# Patient Record
Sex: FEMALE | Race: OTHER | NOT HISPANIC OR LATINO
[De-identification: names, ages, dates, MRNs, and addresses within clinical notes are randomized per-mention and may not be internally consistent; named-entity substitution may affect disease eponyms.]

---

## 2020-04-26 ENCOUNTER — MESSAGE (OUTPATIENT)
Age: 35
End: 2020-04-26

## 2020-05-07 LAB
SARS-COV-2 IGG SERPL IA-ACNC: <0.1 INDEX
SARS-COV-2 IGG SERPL QL IA: NEGATIVE

## 2020-12-29 ENCOUNTER — TRANSCRIPTION ENCOUNTER (OUTPATIENT)
Age: 35
End: 2020-12-29

## 2020-12-29 ENCOUNTER — EMERGENCY (EMERGENCY)
Facility: HOSPITAL | Age: 35
LOS: 1 days | Discharge: HOME | End: 2020-12-29
Attending: EMERGENCY MEDICINE
Payer: COMMERCIAL

## 2020-12-29 VITALS
OXYGEN SATURATION: 99 % | SYSTOLIC BLOOD PRESSURE: 110 MMHG | WEIGHT: 134.92 LBS | DIASTOLIC BLOOD PRESSURE: 77 MMHG | HEART RATE: 75 BPM | RESPIRATION RATE: 16 BRPM | HEIGHT: 63 IN | TEMPERATURE: 98 F

## 2020-12-29 LAB — SARS-COV-2 RNA SPEC QL NAA+PROBE: SIGNIFICANT CHANGE UP

## 2020-12-29 PROCEDURE — 99283 EMERGENCY DEPT VISIT LOW MDM: CPT

## 2020-12-29 PROCEDURE — 87635 SARS-COV-2 COVID-19 AMP PRB: CPT

## 2020-12-29 PROCEDURE — 99053 MED SERV 10PM-8AM 24 HR FAC: CPT

## 2020-12-29 NOTE — ED ADULT TRIAGE NOTE - CHIEF COMPLAINT QUOTE
We're exposed to coworker two weeks ago, she's positive " We're exposed to coworker two weeks ago, she's positive "  Denies symptoms of corona virus infection

## 2020-12-29 NOTE — ED PROVIDER NOTE - GASTROINTESTINAL, MLM
"Abdomen soft, non-tender, no guarding.

## 2020-12-29 NOTE — ED ADULT NURSE NOTE - CHIEF COMPLAINT QUOTE
We're exposed to coworker two weeks ago, she's positive "  Denies symptoms of corona virus infection

## 2020-12-29 NOTE — ED ADULT NURSE NOTE - NSIMPLEMENTINTERV_GEN_ALL_ED
Implemented All Universal Safety Interventions:  [place] to call system. Call bell, personal items and telephone within reach. Instruct patient to call for assistance. Room bathroom lighting operational. Non-slip footwear when patient is off stretcher. Physically safe environment: no spills, clutter or unnecessary equipment. Stretcher in lowest position, wheels locked, appropriate side rails in place.

## 2020-12-29 NOTE — ED PROVIDER NOTE - OBJECTIVE STATEMENT
Patient presenting to the ED for covid screening. Patient reports she was exposed to a covid positive coworker about 5 days ago. Denies symptoms including fever, chills, sweats, cough, chest pain, SOB. No other issues.

## 2020-12-29 NOTE — ED ADULT NURSE NOTE - OBJECTIVE STATEMENT
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 Patient is asymptomatic.

## 2020-12-29 NOTE — ED PROVIDER NOTE - PATIENT PORTAL LINK FT
You can access the FollowMyHealth Patient Portal offered by [facility] by registering at the following website: [email or web address]. By joining [employer]’s FollowMyHealth portal, you will also be able to view your health information using other applications (apps) compatible with our system.

## 2021-01-27 ENCOUNTER — EMERGENCY (EMERGENCY)
Facility: HOSPITAL | Age: 36
LOS: 1 days | Discharge: HOME | End: 2021-01-27
Attending: EMERGENCY MEDICINE
Payer: COMMERCIAL

## 2021-01-27 VITALS
DIASTOLIC BLOOD PRESSURE: 70 MMHG | SYSTOLIC BLOOD PRESSURE: 110 MMHG | RESPIRATION RATE: 16 BRPM | TEMPERATURE: 98 F | HEART RATE: 86 BPM | OXYGEN SATURATION: 100 %

## 2021-01-27 VITALS
DIASTOLIC BLOOD PRESSURE: 71 MMHG | TEMPERATURE: 97 F | OXYGEN SATURATION: 99 % | HEIGHT: 63 IN | SYSTOLIC BLOOD PRESSURE: 108 MMHG | RESPIRATION RATE: 16 BRPM | HEART RATE: 75 BPM

## 2021-01-27 LAB — SARS-COV-2 RNA SPEC QL NAA+PROBE: SIGNIFICANT CHANGE UP

## 2021-01-27 PROCEDURE — U0005: CPT

## 2021-01-27 PROCEDURE — 87635 SARS-COV-2 COVID-19 AMP PRB: CPT

## 2021-01-27 PROCEDURE — 99283 EMERGENCY DEPT VISIT LOW MDM: CPT

## 2021-01-27 PROCEDURE — 99053 MED SERV 10PM-8AM 24 HR FAC: CPT

## 2021-01-27 NOTE — ED ADULT TRIAGE NOTE - CHIEF COMPLAINT QUOTE
I was exposed by someone tested positive for COVID.  I do not have any symptoms.  I need a COVID PCR test.

## 2021-01-27 NOTE — ED PROVIDER NOTE - CLINICAL SUMMARY MEDICAL DECISION MAKING FREE TEXT BOX
36yo F [facility] employee presents after exposure to COVID 19 positive patient while at work. Will send covid swab. Patient stable for discharge.

## 2021-01-27 NOTE — ED PROVIDER NOTE - OBJECTIVE STATEMENT
36yo F [facility] [facility] employee presents after exposure to COVID 19 positive patient while at work.

## 2021-01-27 NOTE — ED PROVIDER NOTE - PATIENT PORTAL LINK FT
You can access the FollowMyHealth Patient Portal offered by [facility] by registering at the following website: [email or web address]. By joining [employer]’s FollowMyHealth portal, you will also be able to view your health information using other applications (apps) compatible with our system.

## 2021-08-01 ENCOUNTER — EMERGENCY (EMERGENCY)
Facility: HOSPITAL | Age: 36
LOS: 1 days | Discharge: HOME | End: 2021-08-01
Attending: EMERGENCY MEDICINE
Payer: COMMERCIAL

## 2021-08-01 VITALS
HEART RATE: 62 BPM | DIASTOLIC BLOOD PRESSURE: 64 MMHG | TEMPERATURE: 98 F | OXYGEN SATURATION: 97 % | WEIGHT: 134.92 LBS | HEIGHT: 63 IN | RESPIRATION RATE: 18 BRPM | SYSTOLIC BLOOD PRESSURE: 116 MMHG

## 2021-08-01 LAB — SARS-COV-2 RNA SPEC QL NAA+PROBE: SIGNIFICANT CHANGE UP

## 2021-08-01 PROCEDURE — 99283 EMERGENCY DEPT VISIT LOW MDM: CPT

## 2021-08-01 PROCEDURE — 87635 SARS-COV-2 COVID-19 AMP PRB: CPT

## 2021-08-01 PROCEDURE — 99282 EMERGENCY DEPT VISIT SF MDM: CPT

## 2021-08-01 NOTE — ED PROVIDER NOTE - PHYSICAL EXAMINATION
General: pt appears stated age and is not in distress  HEENT: AT/NC  Neck: supple, full ROM, trachea midline  Lungs: symmetric excursion  Extremities:  no deformities or fractures  Neuro: awake, alert, responsive; oriented to person, place and time; no neuro deficits normal steady gait

## 2021-08-01 NOTE — ED PROVIDER NOTE - PATIENT PORTAL LINK FT
You can access the FollowMyHealth Patient Portal offered by [facility] by registering at the following website: [email or web address]. By joining [employer]’s FollowMyHealth portal, you will also be able to view your health information using other applications (apps) compatible with our system.

## 2021-08-01 NOTE — ED ADULT TRIAGE NOTE - NS ED NURSE DIRECT TO ROOM YN
Detail Level: Detailed Instructed the patient to apply mupirocin 2% ointment twice daily prior to covering with a bandage. Reassured the patient that the wound has healed well and sutures will be removed today. The patient will follow up for Mohs on 8/23/2018. Reassured the patient that both shave removal sites are healing well. Instructed the patient to apply mupirocin 2% ointment to both sites daily instead of Vaseline. Yes

## 2022-01-01 NOTE — ED ADULT NURSE NOTE - PRO INTERPRETER NEED 2
English Right ear hearing screen completed date: 2022  Right ear screen method: EOAE (evoked otoacoustic emission)  Right ear screen result: Passed  Right ear screen comment: N/A    Left ear hearing screen completed date: 2022  Left ear screen method: EOAE (evoked otoacoustic emission)  Left ear screen result: Passed  Left ear screen comments: N/A

## 2022-01-25 ENCOUNTER — EMERGENCY (EMERGENCY)
Facility: HOSPITAL | Age: 37
LOS: 1 days | Discharge: HOME | End: 2022-01-25
Attending: EMERGENCY MEDICINE
Payer: COMMERCIAL

## 2022-01-25 VITALS
SYSTOLIC BLOOD PRESSURE: 102 MMHG | RESPIRATION RATE: 18 BRPM | WEIGHT: 149.91 LBS | HEART RATE: 96 BPM | DIASTOLIC BLOOD PRESSURE: 74 MMHG | HEIGHT: 63 IN | OXYGEN SATURATION: 97 %

## 2022-01-25 LAB
ALBUMIN SERPL ELPH-MCNC: 3.1 G/DL — LOW (ref 3.5–5)
ALP SERPL-CCNC: 89 U/L — SIGNIFICANT CHANGE UP (ref 40–120)
ALT FLD-CCNC: 42 U/L DA — SIGNIFICANT CHANGE UP (ref 10–60)
ANION GAP SERPL CALC-SCNC: 6 MMOL/L — SIGNIFICANT CHANGE UP (ref 5–17)
AST SERPL-CCNC: 25 U/L — SIGNIFICANT CHANGE UP (ref 10–40)
BILIRUB DIRECT SERPL-MCNC: <0.1 MG/DL — SIGNIFICANT CHANGE UP (ref 0–0.3)
BILIRUB SERPL-MCNC: 0.2 MG/DL — SIGNIFICANT CHANGE UP (ref 0.2–1.2)
BUN SERPL-MCNC: 6 MG/DL — LOW (ref 7–18)
CALCIUM SERPL-MCNC: 8.7 MG/DL — SIGNIFICANT CHANGE UP (ref 8.4–10.5)
CHLORIDE SERPL-SCNC: 106 MMOL/L — SIGNIFICANT CHANGE UP (ref 96–108)
CO2 SERPL-SCNC: 24 MMOL/L — SIGNIFICANT CHANGE UP (ref 22–31)
CREAT SERPL-MCNC: 0.56 MG/DL — SIGNIFICANT CHANGE UP (ref 0.5–1.3)
GLUCOSE SERPL-MCNC: 85 MG/DL — SIGNIFICANT CHANGE UP (ref 70–99)
HCG UR QL: POSITIVE
HCT VFR BLD CALC: 37.9 % — SIGNIFICANT CHANGE UP (ref 34.5–45)
HGB BLD-MCNC: 13.2 G/DL — SIGNIFICANT CHANGE UP (ref 11.5–15.5)
MCHC RBC-ENTMCNC: 34.2 PG — HIGH (ref 27–34)
MCHC RBC-ENTMCNC: 34.8 GM/DL — SIGNIFICANT CHANGE UP (ref 32–36)
MCV RBC AUTO: 98.2 FL — SIGNIFICANT CHANGE UP (ref 80–100)
NRBC # BLD: 0 /100 WBCS — SIGNIFICANT CHANGE UP (ref 0–0)
PLATELET # BLD AUTO: 323 K/UL — SIGNIFICANT CHANGE UP (ref 150–400)
POTASSIUM SERPL-MCNC: 4.1 MMOL/L — SIGNIFICANT CHANGE UP (ref 3.5–5.3)
POTASSIUM SERPL-SCNC: 4.1 MMOL/L — SIGNIFICANT CHANGE UP (ref 3.5–5.3)
PROT SERPL-MCNC: 7.5 G/DL — SIGNIFICANT CHANGE UP (ref 6–8.3)
RBC # BLD: 3.86 M/UL — SIGNIFICANT CHANGE UP (ref 3.8–5.2)
RBC # FLD: 13 % — SIGNIFICANT CHANGE UP (ref 10.3–14.5)
SODIUM SERPL-SCNC: 136 MMOL/L — SIGNIFICANT CHANGE UP (ref 135–145)
WBC # BLD: 10.81 K/UL — HIGH (ref 3.8–10.5)
WBC # FLD AUTO: 10.81 K/UL — HIGH (ref 3.8–10.5)

## 2022-01-25 PROCEDURE — 80053 COMPREHEN METABOLIC PANEL: CPT

## 2022-01-25 PROCEDURE — 99283 EMERGENCY DEPT VISIT LOW MDM: CPT

## 2022-01-25 PROCEDURE — 99284 EMERGENCY DEPT VISIT MOD MDM: CPT

## 2022-01-25 PROCEDURE — 81025 URINE PREGNANCY TEST: CPT

## 2022-01-25 PROCEDURE — 36415 COLL VENOUS BLD VENIPUNCTURE: CPT

## 2022-01-25 PROCEDURE — 85027 COMPLETE CBC AUTOMATED: CPT

## 2022-01-25 PROCEDURE — 82248 BILIRUBIN DIRECT: CPT

## 2022-01-25 NOTE — ED PROVIDER NOTE - NSFOLLOWUPINSTRUCTIONS_ED_ALL_ED_FT
Ramandeep BrownSpanichristina                                                                                                         Needlestick and Sharps Injury      A needlestick injury happens when a person is stuck with a needle or sharp tool (sharps) that may have someone else's blood on it. Needlestick injuries are most common among health care workers, but can also happen to people in the community who are exposed to needles. A needlestick injury may expose you to blood that carries infections such as:  •Hepatitis B.      •Hepatitis C.      •Human immunodeficiency virus (HIV).        What are the causes?    This injury is caused by a needle or other sharp tool that punctures your skin. It can happen to people who are:  •Giving an injection.      •Drawing blood.      •Performing medical procedures with a needle or scalpel.      •Handling or throwing away used needles (sharps).      •Cleaning equipment or patient care areas.      This injury can also occur if you:  •Accidentally come into contact with a needle that was discarded in a public place.      •Share a needle or inject illegal drugs.        What increases the risk?    This injury is more likely to happen to health care workers who:  •Recap needles.      •Pass sharp objects to another person.      •Do not use or have access to needle safety devices.      •Feel pressure or a sense of urgency in the work place when using needles.        What are the signs or symptoms?    Symptoms of this injury include:  •Pain or irritation at the injury site.      •Bleeding at the injury site.        How is this diagnosed?    This condition is diagnosed based on:  •A physical exam.      •How the injury happened.      Your health care provider may check the medical history of the person whose blood you were exposed to. That person's blood may also be tested.      How is this treated?     If you have a needle stick injury or think you may have been exposed to blood or body fluids:  •Wash the injured area right away with soap and water.      •Place a bandage or clean towel on the wound and apply gentle pressure to stop the bleeding. Do not squeeze or rub the area.    •Notify a work place supervisor or health care provider right away. Follow any procedures in your work place if applicable.  •If needed, treatment must be started as soon as possible after the exposure.        •Tell your health care provider if you are pregnant or breastfeeding.      Treatments may include:  •A tetanus booster shot. This shot may be given if you have not had a tetanus booster shot within the past 10 years.      •A hepatitis B vaccination.      •Blood tests. These may be recommended for up to 6 months after the injury to rule out infection.      •Medicines to prevent infection (post-exposure prophylaxis).      •Wound care.        Follow these instructions at home:    Medicines     •Take over-the-counter and prescription medicines only as told by your health care provider.      •If you were prescribed an antibiotic medicine, take it as told by your health care provider. Do not stop using the antibiotic even if you start to feel better.      General instructions     •Keep all follow-up visits as told by your health care provider. This is important.      Wound care   •There are many ways to close and cover a wound. For example, a wound can be covered with sutures, skin glue, or adhesive strips. Follow instructions from your health care provider about:  •How to take care of your wound.      •When and how you should change your dressing.        •Keep the dressing dry as told by your health care provider. Do not take baths, swim, use a hot tub, or do anything that would put your wound underwater until your health care provider approves.    •Check your wound every day for signs of infection. Check for:  •Redness, swelling, or pain.      •Fluid or blood.      •Pus or a bad smell.      •Warmth.          Contact a health care provider if you:    •Have redness, swelling, or pain at the injury site.      •Have a fever.      •Feel anxious, angry, or depressed.      •Have difficulty sleeping.      •Have skin or whites of your eyes that look yellow (jaundice).      •Have belly pain or a feeling of fullness.      •Have fatigue.      •Feel generally sick (malaise).      •Have frequent infections.        Summary    •A needlestick injury happens when a person is stuck with a needle or sharp object that may have someone else's blood on it. The injury may expose the person to blood that carries infections.      •Treatment starts with cleaning the injured area right away with soap and water.      •Treatment may also include a tetanus booster shot, a hepatitis B vaccine, and medicines to prevent infection.      This information is not intended to replace advice given to you by your health care provider. Make sure you discuss any questions you have with your health care provider.      Document Revised: 04/10/2020 Document Reviewed: 01/30/2019    [employer] Patient Education © 2021 [employer]

## 2022-01-25 NOTE — ED PROVIDER NOTE - PATIENT PORTAL LINK FT
You can access the FollowMyHealth Patient Portal offered by [facility] by registering at the following website: [email or web address]. By joining [employer]’s FollowMyHealth portal, you will also be able to view your health information using other applications (apps) compatible with our system.

## 2022-01-25 NOTE — ED PROVIDER NOTE - OBJECTIVE STATEMENT
36 year old female with no significant PMHx who is currently at 27 weeks gestation presents to the ED with complaints of a needle stick just prior to arrival. Patient states that she works as a respiratory therapist here at [employer], and was attending to a patient when she accidentally stuck herself with an ABG needle to her left hand. Patient notes that her hepatitis and tetanus vaccinations are all up to date. Patient is currently refusing HIV medications. NKDA.

## 2022-04-12 ENCOUNTER — OUTPATIENT (OUTPATIENT)
Dept: INPATIENT UNIT | Facility: HOSPITAL | Age: 37
LOS: 1 days | Discharge: HOME | End: 2022-04-12

## 2022-04-12 VITALS — SYSTOLIC BLOOD PRESSURE: 123 MMHG | DIASTOLIC BLOOD PRESSURE: 74 MMHG | HEART RATE: 75 BPM

## 2022-04-12 VITALS
DIASTOLIC BLOOD PRESSURE: 85 MMHG | HEART RATE: 93 BPM | TEMPERATURE: 99 F | SYSTOLIC BLOOD PRESSURE: 124 MMHG | RESPIRATION RATE: 16 BRPM

## 2022-04-12 DIAGNOSIS — Z98.890 OTHER SPECIFIED POSTPROCEDURAL STATES: Chronic | ICD-10-CM

## 2022-04-12 DIAGNOSIS — O26.899 OTHER SPECIFIED PREGNANCY RELATED CONDITIONS, UNSPECIFIED TRIMESTER: ICD-10-CM

## 2022-04-12 DIAGNOSIS — Z3A.00 WEEKS OF GESTATION OF PREGNANCY NOT SPECIFIED: ICD-10-CM

## 2022-04-12 PROCEDURE — 99203 OFFICE O/P NEW LOW 30 MIN: CPT

## 2022-04-12 NOTE — OB PROVIDER TRIAGE NOTE - ADDITIONAL INSTRUCTIONS
- Patient stable for discharge home with adequate outpatient follow up  - Instructed patient to follow up with OB/GYN within 1 week   - Educated and discussed labor precautions, fetal kick counts  - Educated and discussed concerning signs/symptoms to call OB/GYN and return to L&D including but not limited to vaginal bleeding, leakage of fluid, painful contractions, decreased fetal movement, fever/chills, shortness of breath, chest pain, rash, difficulty ambulating, nausea/vomiting/diarrhea, worsening of symptoms

## 2022-04-12 NOTE — OB RN TRIAGE NOTE - NSICDXPASTMEDICALHX_GEN_ALL_CORE_FT
PAST MEDICAL HISTORY:  No pertinent past medical history      PAST MEDICAL HISTORY:  Childhood asthma     No pertinent past medical history

## 2022-04-12 NOTE — OB PROVIDER TRIAGE NOTE - HISTORY OF PRESENT ILLNESS
36 year old  @ 38.0 weeks, MALLIKA 2022, consistent with 1st Trimester US, presents to L&D for decreased fetal movement intensity since this morning. Patient states she felt baby movement this AM, but less intense movement even after eating breakfast. Patient states she felt movements recently within 30 minutes, while waiting in triage. Patient states she feels intermittent tightening, but no abdominal pain. Patient states she adequately hydrates, > 5 glasses of water a day, and eats balanced diet. Patient states she has never had this happen before. Patient states that she is doing well otherwise, was at the OB/GYN office today and was told "everything is great" but sent in secondary to the decreased fetal movement frequency. Denies vaginal bleeding, leakage of fluid, painful contractions/lower abdominal cramping, fever/chills, shortness of breath,  chest pain, increased swelling, difficulty ambulating, loss of taste/smell, nausea/vomiting/diarrhea, rash, weakness, paresthesia, change in appetite, dizziness, lightheadedness, cough, nasal congestion, runny nose    OB History: : Current pregnancy, without complications as per patient    GYN Hx: Admits to 2 small fibroids noted on initial prenatal sonogram, Denies ovarian cysts, STDs, abnormal pap smears     Med Hx: Mild Intermittent Asthma, last used albuterol inhaler 1 year ago, no hospitalizations/intubations               Denies asthma, HTN, DM, CAD, or other medical issues    Meds: PNV, Albuterol PRN, denies other medications including prescribed/OTC     Allergies: NKDA, [employer], NKFA    Surgical Hx: Lasik , uncomplicated, denies other surgeries     Vitals: ICU Vital Signs Last 24 Hrs  T(C): 37.2 (2022 15:25), Max: 37.2 (2022 14:55)  T(F): 98.96 (2022 15:25), Max: 99 (2022 14:55)  HR: 68 (2022 16:56) (68 - 93)  BP: 114/76 (2022 16:56) (114/76 - 124/85)  BP(mean): --  ABP: --  ABP(mean): --  RR: 16 (2022 14:55) (16 - 16)  SpO2: --    Gen: NAD, A+O x 3, resting comfortably  Resp: CTAB  Cardio: RRR  Abd: Gravid, soft, non-distended, non-tender to superficial and deep palpation in all 4 quadrants, no rebound/guarding  Ext: Warm, well perfused, 1+ nonpitting edema bilaterally    EFM: 135 bpm, moderate variability with spontaneous accelerations, isolated subtle late deceleration, resolved spontaneously, otherwise Cat I tracing  [place]: Irregular painless contractions    SSE: deferred  SVE: closed/long/high, posterior     Bedside Transabdominal US: Cephalic presentation, posterior placenta, SYDNI 13.79 cm MVP 4.39 cm, BPP 8/8

## 2022-04-12 NOTE — OB PROVIDER TRIAGE NOTE - NSOBPROVIDERNOTE_OBGYN_ALL_OB_FT
36 year old  @ 38.0 weeks, presented for decreased fetal movement since this AM with decreased intensity of movement still feeling adequate fetal movement frequency, with BPP 8/8, noted isolated late deceleration with spontaneous recovery, vital signs stable  - Prolonged monitoring secondary to isolated to late deceleration, intrauterine resuscitative measure taken (maternal lateral repositioning, PO hydration)  - Continue continuous EFM/toco  - Discussed with Dr. Smith in triage regarding HPI, history, physical exam, EFM/toco, sonogram, assessment and plan    PA ADDENDUM   EFM:  [place]:   - Patient stable for discharge home with adequate outpatient follow up  - Instructed patient to follow up with OB/GYN within 1 week   - Educated and discussed labor precautions, fetal kick counts  - Educated and discussed concerning signs/symptoms to call OB/GYN and return to L&D including but not limited to vaginal bleeding, leakage of fluid, painful contractions, decreased fetal movement, fever/chills, shortness of breath, chest pain, rash, difficulty ambulating, nausea/vomiting/diarrhea, worsening of symptoms  - Patient states she understands and agrees with assessment and plan, all questions answered  - Discussed with Dr. Smith on L&D regarding EFM/toco, sonogram, assessment and plan 36 year old  @ 38.0 weeks, presented for decreased fetal movement since this AM with decreased intensity of movement still feeling adequate fetal movement frequency, with BPP 8/8, noted isolated late deceleration with spontaneous recovery, vital signs stable  - Prolonged monitoring secondary to isolated to late deceleration, intrauterine resuscitative measure taken (maternal lateral repositioning, PO hydration)  - Continue continuous EFM/toco  - Discussed with Dr. Smith in triage regarding HPI, history, physical exam, EFM/toco, sonogram, assessment and plan    PA ADDENDUM   EFM: 130 bpm, moderate variability with spontaneous accelerations, no decelerations, Cat I tracing  [place]: Irregular painless contractions  - Patient stable for discharge home with adequate outpatient follow up  - Instructed patient to follow up with OB/GYN within 1 week   - Educated and discussed labor precautions, fetal kick counts  - Educated and discussed concerning signs/symptoms to call OB/GYN and return to L&D including but not limited to vaginal bleeding, leakage of fluid, painful contractions, decreased fetal movement, fever/chills, shortness of breath, chest pain, rash, difficulty ambulating, nausea/vomiting/diarrhea, worsening of symptoms  - Patient states she understands and agrees with assessment and plan, all questions answered  - Discussed with Dr. Smith on L&D regarding EFM/toco, sonogram, assessment and plan

## 2022-04-12 NOTE — OB PROVIDER TRIAGE NOTE - NSHPLABSRESULTS_GEN_ALL_CORE
Prenatal Labs Reviewed:  T&S: Not in chart or history at this time  Rubella: Immune  Hep B: Neg  HIV: Neg   RPR: Neg  GTT: 73/171/132/88

## 2022-04-20 ENCOUNTER — OUTPATIENT (OUTPATIENT)
Dept: INPATIENT UNIT | Facility: HOSPITAL | Age: 37
LOS: 1 days | Discharge: HOME | End: 2022-04-20
Payer: COMMERCIAL

## 2022-04-20 VITALS
HEART RATE: 93 BPM | RESPIRATION RATE: 18 BRPM | DIASTOLIC BLOOD PRESSURE: 78 MMHG | SYSTOLIC BLOOD PRESSURE: 122 MMHG | TEMPERATURE: 98 F

## 2022-04-20 VITALS — SYSTOLIC BLOOD PRESSURE: 116 MMHG | DIASTOLIC BLOOD PRESSURE: 73 MMHG | HEART RATE: 72 BPM

## 2022-04-20 DIAGNOSIS — O26.899 OTHER SPECIFIED PREGNANCY RELATED CONDITIONS, UNSPECIFIED TRIMESTER: ICD-10-CM

## 2022-04-20 DIAGNOSIS — Z98.890 OTHER SPECIFIED POSTPROCEDURAL STATES: Chronic | ICD-10-CM

## 2022-04-20 DIAGNOSIS — Z3A.00 WEEKS OF GESTATION OF PREGNANCY NOT SPECIFIED: ICD-10-CM

## 2022-04-20 PROBLEM — J45.909 UNSPECIFIED ASTHMA, UNCOMPLICATED: Chronic | Status: ACTIVE | Noted: 2022-04-12

## 2022-04-20 PROCEDURE — 76818 FETAL BIOPHYS PROFILE W/NST: CPT | Mod: 26

## 2022-04-20 PROCEDURE — 99212 OFFICE O/P EST SF 10 MIN: CPT

## 2022-04-20 RX ORDER — ACETAMINOPHEN 500 MG
975 TABLET ORAL ONCE
Refills: 0 | Status: COMPLETED | OUTPATIENT
Start: 2022-04-20 | End: 2022-04-20

## 2022-04-20 RX ADMIN — Medication 975 MILLIGRAM(S): at 16:00

## 2022-04-20 RX ADMIN — Medication 975 MILLIGRAM(S): at 15:30

## 2022-04-20 NOTE — OB RN TRIAGE NOTE - FALL HARM RISK - UNIVERSAL INTERVENTIONS
Bed in lowest position, wheels locked, appropriate side rails in place/Call bell, personal items and telephone in reach/Instruct patient to call for assistance before getting out of bed or chair/Non-slip footwear when patient is out of bed/[place] to call system/Physically safe environment - no spills, clutter or unnecessary equipment/Purposeful Proactive Rounding/Room/bathroom lighting operational, light cord in reach

## 2022-04-20 NOTE — OB PROVIDER TRIAGE NOTE - NSHPPHYSICALEXAM_GEN_ALL_CORE
Vital Signs Last 24 Hrs  T(C): 36.6 (20 Apr 2022 14:38), Max: 36.6 (20 Apr 2022 14:38)  T(F): 97.9 (20 Apr 2022 14:38), Max: 97.9 (20 Apr 2022 14:38)  HR: 92 (20 Apr 2022 15:03) (92 - 93)  BP: 132/86 (20 Apr 2022 15:03) (122/78 - 132/86)  BP(mean): --  RR: 18 (20 Apr 2022 14:38) (18 - 18)  SpO2: --    VSS  Abdomen soft nontender gravid   bpm moderate variability   contractions noted q 1-3 minutes mild on palpation   labial varicosities noted bilaterally       VE: closed Vital Signs Last 24 Hrs  T(C): 36.6 (20 Apr 2022 14:38), Max: 36.6 (20 Apr 2022 14:38)  T(F): 97.9 (20 Apr 2022 14:38), Max: 97.9 (20 Apr 2022 14:38)  HR: 92 (20 Apr 2022 15:03) (92 - 93)  BP: 132/86 (20 Apr 2022 15:03) (122/78 - 132/86)  BP(mean): --  RR: 18 (20 Apr 2022 14:38) (18 - 18)  SpO2: --    VSS  Abdomen soft nontender gravid   bpm moderate variability   contractions noted q 1-3 minutes mild on palpation   labial varicosities noted bilaterally       VE: closed    BPP 8/8  Cephalic; anterior placenta  SYDNI: 16cm

## 2022-04-20 NOTE — OB PROVIDER TRIAGE NOTE - HISTORY OF PRESENT ILLNESS
36 y.o. G1 at 39.1w presenting with complaints of intermittent back pain and abominable cramping.  Patient reports pain is presently 7/10 pain.  Patient reports positive fetal movement, denies LOF, denies vaginal  bleeding.    a/p course complications patient denies    pmh: asthma, last attack in childhood  psh: lasik 2013  obhx: denies  gynhx: myomas     NKDA

## 2022-04-20 NOTE — OB PROVIDER TRIAGE NOTE - NSOBPROVIDERNOTE_OBGYN_ALL_OB_FT
patient for reexam in 2 hours  discussed with dr foster patient for reexam in 2 hours  discussed with dr foster    3108  patient reexamined  vaginal exam unchanged  patient feels relief from tylenol administration    desires to go home  reviewed labor precautions  reviewed fetal kick counts   encouraged abdominal binder due to increased pressure     has OB appointment for Next tuesday  return to triage otherwise    all questions answered   verbalized understanding  discharge reviewed with Dr. Tee

## 2022-04-27 NOTE — OB RN TRIAGE NOTE - RESPIRATORY RATE (BREATHS/MIN)
If you are a smoker, it is important for your health to stop smoking. Please be aware that second hand smoke is also harmful.
18

## 2022-04-30 ENCOUNTER — INPATIENT (INPATIENT)
Facility: HOSPITAL | Age: 37
LOS: 2 days | Discharge: HOME HEALTH | End: 2022-05-03
Attending: OBSTETRICS & GYNECOLOGY | Admitting: OBSTETRICS & GYNECOLOGY

## 2022-04-30 VITALS
SYSTOLIC BLOOD PRESSURE: 148 MMHG | RESPIRATION RATE: 18 BRPM | HEART RATE: 103 BPM | DIASTOLIC BLOOD PRESSURE: 89 MMHG | TEMPERATURE: 98 F

## 2022-04-30 DIAGNOSIS — O26.899 OTHER SPECIFIED PREGNANCY RELATED CONDITIONS, UNSPECIFIED TRIMESTER: ICD-10-CM

## 2022-04-30 DIAGNOSIS — O42.90 PREMATURE RUPTURE OF MEMBRANES, UNSPECIFIED AS TO LENGTH OF TIME BETWEEN RUPTURE AND ONSET OF LABOR, UNSPECIFIED WEEKS OF GESTATION: ICD-10-CM

## 2022-04-30 DIAGNOSIS — Z3A.00 WEEKS OF GESTATION OF PREGNANCY NOT SPECIFIED: ICD-10-CM

## 2022-04-30 DIAGNOSIS — Z98.890 OTHER SPECIFIED POSTPROCEDURAL STATES: Chronic | ICD-10-CM

## 2022-04-30 LAB
ALBUMIN SERPL ELPH-MCNC: 3.5 G/DL — SIGNIFICANT CHANGE UP (ref 3.3–5)
ALP SERPL-CCNC: 316 U/L — HIGH (ref 40–120)
ALT FLD-CCNC: 48 U/L — HIGH (ref 4–33)
ANION GAP SERPL CALC-SCNC: 11 MMOL/L — SIGNIFICANT CHANGE UP (ref 7–14)
APPEARANCE UR: ABNORMAL
APTT BLD: 29.2 SEC — SIGNIFICANT CHANGE UP (ref 27–36.3)
AST SERPL-CCNC: 41 U/L — HIGH (ref 4–32)
BACTERIA # UR AUTO: NEGATIVE — SIGNIFICANT CHANGE UP
BASOPHILS # BLD AUTO: 0.03 K/UL — SIGNIFICANT CHANGE UP (ref 0–0.2)
BASOPHILS NFR BLD AUTO: 0.4 % — SIGNIFICANT CHANGE UP (ref 0–2)
BILIRUB SERPL-MCNC: 0.2 MG/DL — SIGNIFICANT CHANGE UP (ref 0.2–1.2)
BILIRUB UR-MCNC: NEGATIVE — SIGNIFICANT CHANGE UP
BLD GP AB SCN SERPL QL: NEGATIVE — SIGNIFICANT CHANGE UP
BUN SERPL-MCNC: 9 MG/DL — SIGNIFICANT CHANGE UP (ref 7–23)
CALCIUM SERPL-MCNC: 8.9 MG/DL — SIGNIFICANT CHANGE UP (ref 8.4–10.5)
CHLORIDE SERPL-SCNC: 103 MMOL/L — SIGNIFICANT CHANGE UP (ref 98–107)
CO2 SERPL-SCNC: 20 MMOL/L — LOW (ref 22–31)
COLOR SPEC: SIGNIFICANT CHANGE UP
COVID-19 SPIKE DOMAIN AB INTERP: POSITIVE
COVID-19 SPIKE DOMAIN ANTIBODY RESULT: 208 U/ML — HIGH
CREAT ?TM UR-MCNC: 46 MG/DL — SIGNIFICANT CHANGE UP
CREAT SERPL-MCNC: 0.59 MG/DL — SIGNIFICANT CHANGE UP (ref 0.5–1.3)
DIFF PNL FLD: ABNORMAL
EGFR: 121 ML/MIN/1.73M2 — SIGNIFICANT CHANGE UP
EOSINOPHIL # BLD AUTO: 0.13 K/UL — SIGNIFICANT CHANGE UP (ref 0–0.5)
EOSINOPHIL NFR BLD AUTO: 1.8 % — SIGNIFICANT CHANGE UP (ref 0–6)
EPI CELLS # UR: 7 /HPF — HIGH (ref 0–5)
FIBRINOGEN PPP-MCNC: 776 MG/DL — HIGH (ref 330–520)
GLUCOSE SERPL-MCNC: 111 MG/DL — HIGH (ref 70–99)
GLUCOSE UR QL: NEGATIVE — SIGNIFICANT CHANGE UP
HCT VFR BLD CALC: 34.9 % — SIGNIFICANT CHANGE UP (ref 34.5–45)
HGB BLD-MCNC: 12.1 G/DL — SIGNIFICANT CHANGE UP (ref 11.5–15.5)
HYALINE CASTS # UR AUTO: 1 /LPF — SIGNIFICANT CHANGE UP (ref 0–7)
IANC: 5.06 K/UL — SIGNIFICANT CHANGE UP (ref 1.8–7.4)
IMM GRANULOCYTES NFR BLD AUTO: 0.7 % — SIGNIFICANT CHANGE UP (ref 0–1.5)
INR BLD: 0.95 RATIO — SIGNIFICANT CHANGE UP (ref 0.88–1.16)
KETONES UR-MCNC: NEGATIVE — SIGNIFICANT CHANGE UP
LDH SERPL L TO P-CCNC: 167 U/L — SIGNIFICANT CHANGE UP (ref 135–225)
LEUKOCYTE ESTERASE UR-ACNC: ABNORMAL
LYMPHOCYTES # BLD AUTO: 1.53 K/UL — SIGNIFICANT CHANGE UP (ref 1–3.3)
LYMPHOCYTES # BLD AUTO: 21.3 % — SIGNIFICANT CHANGE UP (ref 13–44)
MCHC RBC-ENTMCNC: 34.4 PG — HIGH (ref 27–34)
MCHC RBC-ENTMCNC: 34.7 GM/DL — SIGNIFICANT CHANGE UP (ref 32–36)
MCV RBC AUTO: 99.1 FL — SIGNIFICANT CHANGE UP (ref 80–100)
MONOCYTES # BLD AUTO: 0.39 K/UL — SIGNIFICANT CHANGE UP (ref 0–0.9)
MONOCYTES NFR BLD AUTO: 5.4 % — SIGNIFICANT CHANGE UP (ref 2–14)
NEUTROPHILS # BLD AUTO: 5.06 K/UL — SIGNIFICANT CHANGE UP (ref 1.8–7.4)
NEUTROPHILS NFR BLD AUTO: 70.4 % — SIGNIFICANT CHANGE UP (ref 43–77)
NITRITE UR-MCNC: NEGATIVE — SIGNIFICANT CHANGE UP
NRBC # BLD: 0 /100 WBCS — SIGNIFICANT CHANGE UP
NRBC # FLD: 0 K/UL — SIGNIFICANT CHANGE UP
PH UR: 6.5 — SIGNIFICANT CHANGE UP (ref 5–8)
PLATELET # BLD AUTO: 224 K/UL — SIGNIFICANT CHANGE UP (ref 150–400)
POTASSIUM SERPL-MCNC: 3.7 MMOL/L — SIGNIFICANT CHANGE UP (ref 3.5–5.3)
POTASSIUM SERPL-SCNC: 3.7 MMOL/L — SIGNIFICANT CHANGE UP (ref 3.5–5.3)
PROT ?TM UR-MCNC: 10 MG/DL — SIGNIFICANT CHANGE UP
PROT ?TM UR-MCNC: 10 MG/DL — SIGNIFICANT CHANGE UP
PROT SERPL-MCNC: 6.6 G/DL — SIGNIFICANT CHANGE UP (ref 6–8.3)
PROT UR-MCNC: NEGATIVE — SIGNIFICANT CHANGE UP
PROT/CREAT UR-RTO: 0.2 RATIO — SIGNIFICANT CHANGE UP (ref 0–0.2)
PROTHROM AB SERPL-ACNC: 11 SEC — SIGNIFICANT CHANGE UP (ref 10.5–13.4)
RBC # BLD: 3.52 M/UL — LOW (ref 3.8–5.2)
RBC # FLD: 13 % — SIGNIFICANT CHANGE UP (ref 10.3–14.5)
RBC CASTS # UR COMP ASSIST: 7 /HPF — HIGH (ref 0–4)
RH IG SCN BLD-IMP: POSITIVE — SIGNIFICANT CHANGE UP
RH IG SCN BLD-IMP: POSITIVE — SIGNIFICANT CHANGE UP
SARS-COV-2 IGG+IGM SERPL QL IA: 208 U/ML — HIGH
SARS-COV-2 IGG+IGM SERPL QL IA: POSITIVE
SARS-COV-2 RNA SPEC QL NAA+PROBE: SIGNIFICANT CHANGE UP
SODIUM SERPL-SCNC: 134 MMOL/L — LOW (ref 135–145)
SP GR SPEC: 1.01 — SIGNIFICANT CHANGE UP (ref 1–1.05)
URATE SERPL-MCNC: 3.3 MG/DL — SIGNIFICANT CHANGE UP (ref 2.5–7)
UROBILINOGEN FLD QL: SIGNIFICANT CHANGE UP
WBC # BLD: 7.19 K/UL — SIGNIFICANT CHANGE UP (ref 3.8–10.5)
WBC # FLD AUTO: 7.19 K/UL — SIGNIFICANT CHANGE UP (ref 3.8–10.5)
WBC UR QL: 6 /HPF — HIGH (ref 0–5)

## 2022-04-30 RX ORDER — SODIUM CHLORIDE 9 MG/ML
1000 INJECTION, SOLUTION INTRAVENOUS
Refills: 0 | Status: DISCONTINUED | OUTPATIENT
Start: 2022-04-30 | End: 2022-05-01

## 2022-04-30 RX ORDER — OXYTOCIN 10 UNIT/ML
333.33 VIAL (ML) INJECTION
Qty: 20 | Refills: 0 | Status: DISCONTINUED | OUTPATIENT
Start: 2022-04-30 | End: 2022-05-02

## 2022-04-30 NOTE — OB PROVIDER TRIAGE NOTE - HISTORY OF PRESENT ILLNESS
37yo female P0 @ 40.4 wks SLIUP uncomp PNC here from Dr Elias' office for evaluation of pt complaint of decreased FM. Pt also reports that 4 days ago on Tuesday 4/26 she lost her mucous plug and has an increased in her vaginal discharge with occasional loss of what she believed to be urine. Pt denies BP issues during pregnancy and reports occasional mild headaches, denies RUQ or epigastric pain, vision changes. Pt reports mild irregular ctx's; denies fever or chills.    Pmhx-childhood asthma-no intubations; last used inhaler 1 yr ago  Pshx/Hosp-LASIX  Meds-PNV; albuterol PRN  NKDA  Past ob-denies  Gyn-fibroids  Soc-denies

## 2022-04-30 NOTE — OB PROVIDER H&P - NSHPPHYSICALEXAM_GEN_ALL_CORE
Gen: A&O x 3; NAD  Vitals: BP-148/89 (EMTALA); 128/79; 140/92            P-103; T-36.5    Pulm-CTA B/L, no wheezes  Cor-clear S1S2  Abd exam-soft and nontender    TAS-to confirm vtx presentation    NST cat I with 140 baseline with accels and mod variability; ctx's Q2-4 min  GBS neg as of 3/30/2022    SSE-+pooling of clear fluid; + nitrazine; + ferning  VE-0/50/-2  EFW~3685

## 2022-04-30 NOTE — OB RN TRIAGE NOTE - CHIEF COMPLAINT QUOTE
decreased fetal movement, sent in from Dr Elias office decreased fetal movement, sent in from Dr Elias office, leaking fluid x4 days.

## 2022-04-30 NOTE — OB PROVIDER H&P - HISTORY OF PRESENT ILLNESS
35yo female P0 @ 40.4 wks SLIUP uncomp PNC here from Dr Elias' office for evaluation of pt complaint of decreased FM. Pt also reports that 4 days ago on Tuesday 4/26 she lost her mucous plug and has an increased in her vaginal discharge with occasional loss of what she believed to be urine. Pt denies BP issues during pregnancy and reports occasional mild headaches, denies RUQ or epigastric pain, vision changes. Pt reports mild irregular ctx's; denies fever or chills.    Pmhx-childhood asthma-no intubations; last used inhaler 1 yr ago  Pshx/Hosp-LASIX  Meds-PNV; albuterol PRN  NKDA  Past ob-denies  Gyn-fibroids  Soc-denies

## 2022-04-30 NOTE — OB PROVIDER H&P - ASSESSMENT
35yo female P0 @ 40.4 wks SLIUP uncomp PNC here with ROM x 4 days and decreased FM  -SSE confirms ROM, fluid is clear; pt is afebrile  -BP's with two in the 140's/ 90's; baseline HELLP labs sent  -pt was swabbed for covid-19 and support provides proof of vaccination  -pt was dw Dr Elias  -pt was admitted for PO cytotec IOL for prolonged ROM  -PNR's were reviewed

## 2022-04-30 NOTE — OB RN TRIAGE NOTE - NSICDXNOFAMILYHX_GEN_ALL_CORE
Patient:  Josh Waterman    Procedure Summary     Date:  05/28/19 Room / Location:  [facility] OR  / [facility] OR    Anesthesia Start:  0820 Anesthesia Stop:      Procedure:  KNEE TOTAL REPLACEMENT (Right Knee) Diagnosis:  (right knee degenerative joint disease <-- Click to add NO pertinent Family History

## 2022-04-30 NOTE — OB RN PATIENT PROFILE - NS_SOCIALWORKCONS_OBGYN_ALL_OB
CARDIOLOGY PROGRESS NOTE       SUBJECTIVE   Tamela Andres is a 66 year old female who has a history of stress-induced cardiomyopathy EF 37% on echo, hypertension, new onset paroxysmal atrial fibrillation s/p cardioversion 07/22/2020, hyperlipidemia, asthma, and Raynaud's syndrome who presents for follow-up. Since her last visit 11/24/2020 the patient states that she has been feeling well. She states that she is concerned about her chronic cough with frothy sputum. This is chronic for her which she correlates with her asthma. The patient denies any chest pains, palpitations, or shortness of breath. The patient denies any lightheadedness or dizziness. There is no orthopnea or PND. No other complaints at this time.    The patient was seen in the ED at [facility] in [place] 11/17/2020, she had been having some lightheadedness, and dizziness onset for 3 or 4 days prior. She was told she was dehydrated. Cardiac enzymes were normal at that time. ECG at that time demonstrated sinus bradycardia, but was otherwise normal. She was discharged with an inhaler and prednisone.    Patient was admitted from 07/20-08/31/2020 for severe sepsis with septic shock secondary to ischemic bowel s/p laparotomy with subtotal colectomy, end ileostomy, splenectomy, and cholecystectomy 07/21/2020, generalized weakness new onset atrial fibrillation s/p cardioversion 07/22/2020, stress induced cardiomyopathy, EF 37% on echo, acute blood loss anemia, and acute renal injury.     Echocardiogram 01/21/2021: EF 63%  Trace-to-mild tricuspid valve regurgitation.  Normal left ventricular size, systolic function and wall thickness, with no regional wall motion abnormalities.    Stress Test 09/28/2020:  This stress test is within normal limits and is negative for ischemia.  At the time of this dictation, the Cardiolite portion is still pending.  Correlation with nuclear scan is recommended.   Nuclear Portion:  1.  No evidence of  Regadenoson-induced ischemia.  2.  No evidence of previous myocardial injury pattern.  3.  Normal left ventricular size with satisfactory resting systolic performance.    Echocardiogram 2020:  Normal left ventricular cavity size.   Normal left ventricular wall thickness.   Moderately decreased left ventricular systolic function.  Left ventricular ejection fraction, 37 %.   Akinesis of the apical LV wall.    Echocardiogram 2020:  Normal left ventricular size, systolic function and wall thickness, with no regional wall motion abnormalities.  Trace-to-mild tricuspid valve regurgitation.      PAST MEDICAL HISTORY      Past Medical History:   Diagnosis Date   • Anesthesia complication    • Anxiety disorder    • Arthralgia    • Aspergillosis (CMS/HCC)    • Asthma    • Bronchitis    • Carpal tunnel syndrome    • Chronic constipation    • Chronic kidney disease     stage 3   • Chronic pain     multiple joint pain   • Chronic pelvic pain in female    • DDD (degenerative disc disease), cervical    • Difficult intravenous access 2019    Noted in Care Everywhere surgical records    • Endometriosis    • Essential (primary) hypertension    • Fracture     rt foot second and third toe   • Gastroesophageal reflux disease    • Generalized weakness    • Hiatal hernia    • High cholesterol    • Impaired mobility 2019    Uses a cane, Knee pain    • Inflammatory arthritis    • Insomnia    • Menorrhagia    • Multinodular thyroid    • Myalgia and myositis, unspecified    • Osteoarthritis    • Osteoporosis 2010   • Pneumonia    • PONV (postoperative nausea and vomiting)     once during a    • Psoriasis    • Raynaud's syndrome    • Recurrent falls 2018    and 2019; tripped over dog    • Rheumatoid arthritis (CMS/HCC)    • Rotator cuff tear     right   • Rotator cuff tear     x 3 ,[id number]   • Seasonal allergies    • Sicca syndrome (CMS/HCC)    • Sinusitis    • Squamous cell cancer of  skin of nasal tip    • Thyroid condition          SOCIAL HISTORY      Social History     Tobacco Use   • Smoking status: Never Smoker   • Smokeless tobacco: Never Used   Substance Use Topics   • Alcohol use: Yes     Comment: 3/month beers   • Drug use: No       FAMILY HISTORY      Family History   Problem Relation Age of Onset   • Asthma Mother    • Cancer, Lung Mother    • High cholesterol Mother    • Cancer Mother 68        lung cancer   • Congestive Heart Failure Father    • Asthma Brother    • Amblyopia Brother    • Cancer Son         TESTICAL CANCER   • Cancer, Lung Maternal Aunt    • Cancer, Breast Maternal Aunt    • Cancer, Lung Maternal Grandmother    • Cancer, Lung Maternal Grandfather          MEDICATIONS/INFUSIONS     Current Outpatient Medications   Medication Sig   • metoPROLOL succinate (TOPROL-XL) 25 MG 24 hr tablet Take 3 tablets by mouth daily.   • isosorbide mononitrate (IMDUR) 30 MG 24 hr tablet Take 1 tablet by mouth daily. Do not start before September 1, 2020.   • AMIODarone (PACERONE) 200 MG tablet Take 1 tablet by mouth daily.   • atorvastatin (LIPITOR) 40 MG tablet Take 1 tablet by mouth daily.   • valsartan (DIOVAN) 40 MG tablet Take 1 tablet by mouth daily.   • rivaroxaban (XARELTO) 15 MG Tab Take 1 tablet by mouth daily (with dinner).   • cyclobenzaprine (FLEXERIL) 5 MG tablet Take 5 mg by mouth 3 times daily as needed.   • cevimeline (EVOXAC) 30 MG capsule Take 30 mg by mouth 3 times daily.   • HYDROcodone-acetaminophen (NORCO) 5-325 MG per tablet Take 1 tablet by mouth every 6 hours as needed for Pain.   • pilocarpine (SALAGEN) 5 MG tablet Take 1 tablet by mouth 3 times daily.   • metaxalone (SKELAXIN) 800 MG tablet Take 1 tablet by mouth 3 times daily. (Patient taking differently: Take 800 mg by mouth 3 times daily. TAKES ONCE DAILY)   • budesonide (PULMICORT) 0.5 MG/2ML nebulizer suspension Take 0.5 mg by nebulization 4 times daily as needed.   • HYDROcodone-acetaminophen (Norco)  5-325 MG per tablet Take 1 tablet by mouth 2 times daily as needed for Pain (for post procedure pain only).   • DISPENSE Sureprep no sting skin protective Barrier Wipes quant of 90 or a 90 day supply.   • DISPENSE Stomahesive protective powder quant 90 or 90 day supply   • traZODone (DESYREL) 50 MG tablet TAKE 1 TABLET BY MOUTH EVERY NIGHT   • cycloSPORINE (Restasis) 0.05 % ophthalmic emulsion Place 1 drop into both eyes 2 times daily.   • Ostomy Supplies Kit Use as directed; needs life long ostomy supplies   • nystatin (MYCOSTATIN) 993951 UNIT/GM powder Apply topically every 12 hours.   • loratadine (CLARITIN) 10 MG tablet Take 10 mg by mouth daily.   • albuterol 108 (90 Base) MCG/ACT inhaler Inhale 2 puffs into the lungs Every 4 hours as needed for Shortness of Breath or Wheezing.   • pantoprazole (PROTONIX) 40 MG tablet Take 1 tablet by mouth daily (before breakfast). Do not start before September 1, 2020.   • butalbital-APAP-caffeine (FIORICET) -40 MG per tablet Take 1 tablet by mouth every 4 hours as needed for Headaches.   • guaiFENesin-DM, (ROBITUSSIN DM) 100-10 MG/5ML syrup Take 10 mLs by mouth every 4 hours as needed for Cough.   • CARBOXYMethylcellulose (REFRESH PLUS) 0.5 % ophthalmic solution Place 1 drop into both eyes 4 times daily as needed for Dry Eyes.   • montelukast (SINGULAIR) 10 MG tablet TAKE 1 TABLET BY MOUTH DAILY   • WIXELA INHUB 500-50 MCG/DOSE inhaler INL 1 PUFF PO BID   • acetaminophen (TYLENOL) 500 MG tablet Take 1,000 mg by mouth every 6 hours as needed for Pain.   • tiotropium (SPIRIVA RESPIMAT) 1.25 MCG/ACT inhaler Inhale 1 puff into the lungs daily. Indications: Asthma   • fluticasone (FLONASE) 50 MCG/ACT nasal spray PLACE 2 SPRAYS IN EACH NOSTRIL TWICE DAILY   • Ascorbic Acid (VITAMIN C PO) Take 1 tablet by mouth daily.   • Multiple Vitamin (MULTIVITAMIN PO) Take 1 tablet by mouth daily.   • CALCIUM PO Take 1 tablet by mouth daily.     No current facility-administered  medications for this visit.     Facility-Administered Medications Ordered in Other Visits   Medication   • bupivacaine PF (SENSORCAINE-MPF) 0.5 % (PF) injection 2.5 mg   • lidocaine HCl 2 % injection 10 mg   • NON FORMULARY 60 mg   • bupivacaine PF (SENSORCAINE-MPF) 0.5 % (PF) injection 2.5 mg   • lidocaine HCl 2 % injection 10 mg   • NON FORMULARY 60 mg         ALLERGIES     ALLERGIES:   Allergen Reactions   • Codeine Other (See Comments)     SHE SEES THINGS   • Teriparatide Other (See Comments)     Forteo  Slept for days     • Pneumovax [Pneumococcal Polysaccharides] SWELLING     Swelling at site    • Saquinavir Other (See Comments)     Too sleepy           PHYSICAL EXAM     Vitals:    04/08/21 1459   BP: 112/80   Pulse: 64   Resp: 16   Weight: 58.1 kg   Height: 5' 2\" (1.575 m)   LMP: 06/18/1996     General:  NAD, pleasant, alert and oriented x3.  Neck:  No carotid bruits.  No JVD (jugular venous distension).  Normal carotid upstroke.  Pulmonary:  No retractions or increased work of breathing.  Clear to auscultation bilaterally.  No crackles or wheezing.  Cardiovascular:  PMI (point of maxima impulse) in 5th intercostal place.  RRR.  Normal S1 and S2.  No S3 or S4 appreciated.  There are no murmurs.  Abdomen:  Bowel sounds normoactive.  Soft, nontender, nondistended.  No hepatosplenomegaly.  Extremities:  No edema or cyanosis.   Peripheral pulses 2+ and symmetric.     Cholesterol (mg/dL)   Date Value   12/04/2020 171     HDL (mg/dL)   Date Value   12/04/2020 100     Cholesterol/ HDL Ratio (no units)   Date Value   12/04/2020 1.7     Triglycerides (mg/dL)   Date Value   12/04/2020 82     LDL (mg/dL)   Date Value   12/04/2020 55     Creatinine (mg/dL)   Date Value   02/23/2021 1.18 (H)     BUN (mg/dL)   Date Value   02/23/2021 21 (H)         ASSESSMENT     1. Recent admission for acute hypoxic respiratory failure and acute renal injury  2. Cardiomyopathy - likely stress induced EF 37% on echo 07/22/2020, improved to  63% on echo 01/21/2021  3. Ischemic bowel s/p subtotal colectomy with mobilization of splenic flexure and end ileostomy, splenectomy, and cholecystectomy  4. Hypertension  5. Paroxysmal atrial fibrillation- cardioversion 7/22/2020    PLAN   Tamela Andres is a 66 year old female who has a history of stress-induced cardiomyopathy EF 37% on echo, hypertension, new onset paroxysmal atrial fibrillation s/p cardioversion 07/22/2020, hyperlipidemia, asthma, and Raynaud's syndrome who is not having any complaints of chest pain or shortness of breath. Her echocardiogram 01/21/2021 was stable with an EF of 63% and trace to mild TR, but was otherwise unremarkable. The patient's stress test 09/28/2020 was negative for ischemia, and her LV systolic function was improved from previous. She is stable from a cardiac standpoint to proceed with her ablation and hernia repair. Her hepatic function, TSH, and chest imaging has been stable for Amiodarone protocol. Her blood pressure is stable. Her cholesterol is stable as of 12/2020, LDL 55. May reevaluate continuing anticoagulation at a later date, continue Xarelto 15 mg once daily with food. The patient is otherwise doing well from a cardiac standpoint on her current regimen. The patient will follow up with me in 6 months. Thank you for allowing me to participate in the care of this patient.     On 4/8/2021, I Ottoniel Aj, personally transcribed this document on behalf of Dr. Benitez Echavarria MD.    The documentation recorded by the scribe accurately and completely reflects the service(s) I personally performed and the decisions made by me.         Sincerely,      Benitez Echavarria MD-PhD, [facility]             No

## 2022-04-30 NOTE — OB PROVIDER TRIAGE NOTE - NSHPPHYSICALEXAM_GEN_ALL_CORE
Gen: A&O x 3; NAD  Vitals: BP-148/89 (EMTALA); 128/79; 140/92            P-103; T-36.5    Pulm-CTA B/L, no wheezes  Cor-clear S1S2  Abd exam-soft and nontender    TAS-to confirm vtx presentation    NST cat I with 140 baseline with accels and mod variability; ctx's Q2-4 min  GBS neg as of 3/30/2022    SSE-+pooling of clear fluid; + nitrazine; + ferning  VE-0/50/-2

## 2022-04-30 NOTE — OB RN PATIENT PROFILE - FALL HARM RISK - UNIVERSAL INTERVENTIONS
Bed in lowest position, wheels locked, appropriate side rails in place/Call bell, personal items and telephone in reach/Instruct patient to call for assistance before getting out of bed or chair/Non-slip footwear when patient is out of bed/[place] to call system/Physically safe environment - no spills, clutter or unnecessary equipment/Purposeful Proactive Rounding/Room/bathroom lighting operational, light cord in reach

## 2022-05-01 ENCOUNTER — TRANSCRIPTION ENCOUNTER (OUTPATIENT)
Age: 37
End: 2022-05-01

## 2022-05-01 LAB
ALBUMIN SERPL ELPH-MCNC: 3.3 G/DL — SIGNIFICANT CHANGE UP (ref 3.3–5)
ALP SERPL-CCNC: 309 U/L — HIGH (ref 40–120)
ALT FLD-CCNC: 42 U/L — HIGH (ref 4–33)
ANION GAP SERPL CALC-SCNC: 11 MMOL/L — SIGNIFICANT CHANGE UP (ref 7–14)
APTT BLD: 30.9 SEC — SIGNIFICANT CHANGE UP (ref 27–36.3)
AST SERPL-CCNC: 39 U/L — HIGH (ref 4–32)
BASOPHILS # BLD AUTO: 0.04 K/UL — SIGNIFICANT CHANGE UP (ref 0–0.2)
BASOPHILS NFR BLD AUTO: 0.3 % — SIGNIFICANT CHANGE UP (ref 0–2)
BILIRUB SERPL-MCNC: 0.4 MG/DL — SIGNIFICANT CHANGE UP (ref 0.2–1.2)
BUN SERPL-MCNC: 7 MG/DL — SIGNIFICANT CHANGE UP (ref 7–23)
CALCIUM SERPL-MCNC: 9 MG/DL — SIGNIFICANT CHANGE UP (ref 8.4–10.5)
CHLORIDE SERPL-SCNC: 104 MMOL/L — SIGNIFICANT CHANGE UP (ref 98–107)
CO2 SERPL-SCNC: 19 MMOL/L — LOW (ref 22–31)
CREAT SERPL-MCNC: 0.6 MG/DL — SIGNIFICANT CHANGE UP (ref 0.5–1.3)
EGFR: 121 ML/MIN/1.73M2 — SIGNIFICANT CHANGE UP
EOSINOPHIL # BLD AUTO: 0.21 K/UL — SIGNIFICANT CHANGE UP (ref 0–0.5)
EOSINOPHIL NFR BLD AUTO: 1.6 % — SIGNIFICANT CHANGE UP (ref 0–6)
FIBRINOGEN PPP-MCNC: 728 MG/DL — HIGH (ref 330–520)
GLUCOSE SERPL-MCNC: 81 MG/DL — SIGNIFICANT CHANGE UP (ref 70–99)
HCT VFR BLD CALC: 32.1 % — LOW (ref 34.5–45)
HCT VFR BLD CALC: 34.8 % — SIGNIFICANT CHANGE UP (ref 34.5–45)
HGB BLD-MCNC: 10.8 G/DL — LOW (ref 11.5–15.5)
HGB BLD-MCNC: 12.1 G/DL — SIGNIFICANT CHANGE UP (ref 11.5–15.5)
IANC: 8.71 K/UL — HIGH (ref 1.8–7.4)
IMM GRANULOCYTES NFR BLD AUTO: 0.5 % — SIGNIFICANT CHANGE UP (ref 0–1.5)
INR BLD: 0.91 RATIO — SIGNIFICANT CHANGE UP (ref 0.88–1.16)
LDH SERPL L TO P-CCNC: 167 U/L — SIGNIFICANT CHANGE UP (ref 135–225)
LYMPHOCYTES # BLD AUTO: 2.81 K/UL — SIGNIFICANT CHANGE UP (ref 1–3.3)
LYMPHOCYTES # BLD AUTO: 22.1 % — SIGNIFICANT CHANGE UP (ref 13–44)
MCHC RBC-ENTMCNC: 33.6 GM/DL — SIGNIFICANT CHANGE UP (ref 32–36)
MCHC RBC-ENTMCNC: 34 PG — SIGNIFICANT CHANGE UP (ref 27–34)
MCHC RBC-ENTMCNC: 34.8 GM/DL — SIGNIFICANT CHANGE UP (ref 32–36)
MCHC RBC-ENTMCNC: 34.8 PG — HIGH (ref 27–34)
MCV RBC AUTO: 100 FL — SIGNIFICANT CHANGE UP (ref 80–100)
MCV RBC AUTO: 100.9 FL — HIGH (ref 80–100)
MONOCYTES # BLD AUTO: 0.9 K/UL — SIGNIFICANT CHANGE UP (ref 0–0.9)
MONOCYTES NFR BLD AUTO: 7.1 % — SIGNIFICANT CHANGE UP (ref 2–14)
NEUTROPHILS # BLD AUTO: 8.71 K/UL — HIGH (ref 1.8–7.4)
NEUTROPHILS NFR BLD AUTO: 68.4 % — SIGNIFICANT CHANGE UP (ref 43–77)
NRBC # BLD: 0 /100 WBCS — SIGNIFICANT CHANGE UP
NRBC # BLD: 0 /100 WBCS — SIGNIFICANT CHANGE UP
NRBC # FLD: 0 K/UL — SIGNIFICANT CHANGE UP
NRBC # FLD: 0 K/UL — SIGNIFICANT CHANGE UP
PLATELET # BLD AUTO: 239 K/UL — SIGNIFICANT CHANGE UP (ref 150–400)
PLATELET # BLD AUTO: 271 K/UL — SIGNIFICANT CHANGE UP (ref 150–400)
POTASSIUM SERPL-MCNC: 3.7 MMOL/L — SIGNIFICANT CHANGE UP (ref 3.5–5.3)
POTASSIUM SERPL-SCNC: 3.7 MMOL/L — SIGNIFICANT CHANGE UP (ref 3.5–5.3)
PROT SERPL-MCNC: 6 G/DL — SIGNIFICANT CHANGE UP (ref 6–8.3)
PROTHROM AB SERPL-ACNC: 10.5 SEC — SIGNIFICANT CHANGE UP (ref 10.5–13.4)
RBC # BLD: 3.18 M/UL — LOW (ref 3.8–5.2)
RBC # BLD: 3.48 M/UL — LOW (ref 3.8–5.2)
RBC # FLD: 13 % — SIGNIFICANT CHANGE UP (ref 10.3–14.5)
RBC # FLD: 13.1 % — SIGNIFICANT CHANGE UP (ref 10.3–14.5)
SODIUM SERPL-SCNC: 134 MMOL/L — LOW (ref 135–145)
T PALLIDUM AB TITR SER: NEGATIVE — SIGNIFICANT CHANGE UP
URATE SERPL-MCNC: 3.2 MG/DL — SIGNIFICANT CHANGE UP (ref 2.5–7)
WBC # BLD: 12.73 K/UL — HIGH (ref 3.8–10.5)
WBC # BLD: 18.73 K/UL — HIGH (ref 3.8–10.5)
WBC # FLD AUTO: 12.73 K/UL — HIGH (ref 3.8–10.5)
WBC # FLD AUTO: 18.73 K/UL — HIGH (ref 3.8–10.5)

## 2022-05-01 RX ORDER — OXYTOCIN 10 UNIT/ML
VIAL (ML) INJECTION
Qty: 30 | Refills: 0 | Status: DISCONTINUED | OUTPATIENT
Start: 2022-05-01 | End: 2022-05-01

## 2022-05-01 RX ORDER — DIBUCAINE 1 %
1 OINTMENT (GRAM) RECTAL EVERY 6 HOURS
Refills: 0 | Status: DISCONTINUED | OUTPATIENT
Start: 2022-05-01 | End: 2022-05-03

## 2022-05-01 RX ORDER — PRAMOXINE HYDROCHLORIDE 150 MG/15G
1 AEROSOL, FOAM RECTAL EVERY 4 HOURS
Refills: 0 | Status: DISCONTINUED | OUTPATIENT
Start: 2022-05-01 | End: 2022-05-03

## 2022-05-01 RX ORDER — OXYCODONE HYDROCHLORIDE 5 MG/1
5 TABLET ORAL
Refills: 0 | Status: DISCONTINUED | OUTPATIENT
Start: 2022-05-01 | End: 2022-05-03

## 2022-05-01 RX ORDER — AER TRAVELER 0.5 G/1
1 SOLUTION RECTAL; TOPICAL EVERY 4 HOURS
Refills: 0 | Status: DISCONTINUED | OUTPATIENT
Start: 2022-05-01 | End: 2022-05-03

## 2022-05-01 RX ORDER — BUTORPHANOL TARTRATE 2 MG/ML
2 INJECTION, SOLUTION INTRAMUSCULAR; INTRAVENOUS ONCE
Refills: 0 | Status: DISCONTINUED | OUTPATIENT
Start: 2022-05-01 | End: 2022-05-01

## 2022-05-01 RX ORDER — SODIUM CHLORIDE 9 MG/ML
3 INJECTION INTRAMUSCULAR; INTRAVENOUS; SUBCUTANEOUS EVERY 8 HOURS
Refills: 0 | Status: DISCONTINUED | OUTPATIENT
Start: 2022-05-01 | End: 2022-05-03

## 2022-05-01 RX ORDER — MAGNESIUM HYDROXIDE 400 MG/1
30 TABLET, CHEWABLE ORAL
Refills: 0 | Status: DISCONTINUED | OUTPATIENT
Start: 2022-05-01 | End: 2022-05-03

## 2022-05-01 RX ORDER — LANOLIN
1 OINTMENT (GRAM) TOPICAL EVERY 6 HOURS
Refills: 0 | Status: DISCONTINUED | OUTPATIENT
Start: 2022-05-01 | End: 2022-05-03

## 2022-05-01 RX ORDER — SIMETHICONE 80 MG/1
80 TABLET, CHEWABLE ORAL EVERY 4 HOURS
Refills: 0 | Status: DISCONTINUED | OUTPATIENT
Start: 2022-05-01 | End: 2022-05-03

## 2022-05-01 RX ORDER — HYDROCORTISONE 1 %
1 OINTMENT (GRAM) TOPICAL EVERY 6 HOURS
Refills: 0 | Status: DISCONTINUED | OUTPATIENT
Start: 2022-05-01 | End: 2022-05-03

## 2022-05-01 RX ORDER — ALBUTEROL 90 UG/1
0 AEROSOL, METERED ORAL
Qty: 0 | Refills: 0 | DISCHARGE

## 2022-05-01 RX ORDER — IBUPROFEN 200 MG
600 TABLET ORAL EVERY 6 HOURS
Refills: 0 | Status: COMPLETED | OUTPATIENT
Start: 2022-05-01 | End: 2023-03-30

## 2022-05-01 RX ORDER — TETANUS TOXOID, REDUCED DIPHTHERIA TOXOID AND ACELLULAR PERTUSSIS VACCINE, ADSORBED 5; 2.5; 8; 8; 2.5 [IU]/.5ML; [IU]/.5ML; UG/.5ML; UG/.5ML; UG/.5ML
0.5 SUSPENSION INTRAMUSCULAR ONCE
Refills: 0 | Status: COMPLETED | OUTPATIENT
Start: 2022-05-01

## 2022-05-01 RX ORDER — KETOROLAC TROMETHAMINE 30 MG/ML
30 SYRINGE (ML) INJECTION ONCE
Refills: 0 | Status: DISCONTINUED | OUTPATIENT
Start: 2022-05-01 | End: 2022-05-01

## 2022-05-01 RX ORDER — SODIUM CHLORIDE 9 MG/ML
1000 INJECTION, SOLUTION INTRAVENOUS ONCE
Refills: 0 | Status: COMPLETED | OUTPATIENT
Start: 2022-05-01 | End: 2022-05-01

## 2022-05-01 RX ORDER — ERTAPENEM SODIUM 1 G/1
1000 INJECTION, POWDER, LYOPHILIZED, FOR SOLUTION INTRAMUSCULAR; INTRAVENOUS EVERY 24 HOURS
Refills: 0 | Status: DISCONTINUED | OUTPATIENT
Start: 2022-05-01 | End: 2022-05-03

## 2022-05-01 RX ORDER — DIPHENHYDRAMINE HCL 50 MG
25 CAPSULE ORAL EVERY 6 HOURS
Refills: 0 | Status: DISCONTINUED | OUTPATIENT
Start: 2022-05-01 | End: 2022-05-03

## 2022-05-01 RX ORDER — OXYCODONE HYDROCHLORIDE 5 MG/1
5 TABLET ORAL ONCE
Refills: 0 | Status: DISCONTINUED | OUTPATIENT
Start: 2022-05-01 | End: 2022-05-03

## 2022-05-01 RX ORDER — OXYTOCIN 10 UNIT/ML
333.33 VIAL (ML) INJECTION
Qty: 20 | Refills: 0 | Status: DISCONTINUED | OUTPATIENT
Start: 2022-05-01 | End: 2022-05-02

## 2022-05-01 RX ORDER — ACETAMINOPHEN 500 MG
975 TABLET ORAL
Refills: 0 | Status: DISCONTINUED | OUTPATIENT
Start: 2022-05-01 | End: 2022-05-03

## 2022-05-01 RX ORDER — BENZOCAINE 10 %
1 GEL (GRAM) MUCOUS MEMBRANE EVERY 6 HOURS
Refills: 0 | Status: DISCONTINUED | OUTPATIENT
Start: 2022-05-01 | End: 2022-05-03

## 2022-05-01 RX ADMIN — SODIUM CHLORIDE 3 MILLILITER(S): 9 INJECTION INTRAMUSCULAR; INTRAVENOUS; SUBCUTANEOUS at 22:49

## 2022-05-01 RX ADMIN — Medication 1000 MILLIUNIT(S)/MIN: at 18:50

## 2022-05-01 RX ADMIN — Medication 975 MILLIGRAM(S): at 22:10

## 2022-05-01 RX ADMIN — Medication 2 MILLIUNIT(S)/MIN: at 14:57

## 2022-05-01 RX ADMIN — BUTORPHANOL TARTRATE 2 MILLIGRAM(S): 2 INJECTION, SOLUTION INTRAMUSCULAR; INTRAVENOUS at 19:29

## 2022-05-01 RX ADMIN — Medication 975 MILLIGRAM(S): at 22:55

## 2022-05-01 RX ADMIN — BUTORPHANOL TARTRATE 2 MILLIGRAM(S): 2 INJECTION, SOLUTION INTRAMUSCULAR; INTRAVENOUS at 02:19

## 2022-05-01 RX ADMIN — Medication 30 MILLIGRAM(S): at 20:12

## 2022-05-01 RX ADMIN — SODIUM CHLORIDE 1000 MILLILITER(S): 9 INJECTION, SOLUTION INTRAVENOUS at 20:31

## 2022-05-01 RX ADMIN — ERTAPENEM SODIUM 120 MILLIGRAM(S): 1 INJECTION, POWDER, LYOPHILIZED, FOR SOLUTION INTRAMUSCULAR; INTRAVENOUS at 20:30

## 2022-05-01 NOTE — DISCHARGE NOTE OB - NS MD DC FALL RISK RISK
SUBJECTIVE:   Obed Viramontes is a 25 year old male who presents to clinic today for the following health issues:    Here today with mother.    RESPIRATORY SYMPTOMS      Duration: x 3 weeks    Description  nasal congestion, sore throat, cough and headache    Severity: moderate    Accompanying signs and symptoms: None    History (predisposing factors):  none    Precipitating or alleviating factors: None    Therapies tried and outcome:  dayquil  Outcome: effective      ENT Symptoms             Symptoms: cc Present Absent Comment   Fever/Chills   X    Fatigue   X    Muscle Aches   X    Eye Irritation   X    Sneezing  X     Nasal Vish/Drg  X     Sinus Pressure/Pain  X     Loss of smell   X    Dental pain   X    Sore Throat  X     Swollen Glands   X    Ear Pain/Fullness  X     Cough  X     Wheeze  X     Chest Pain  X  tightness   Shortness of breath  X     Rash   X    Other   X    For 3 weeks not changing     Mother is here   Pts/po heart transplant   See lab pf 1-11-18 per card with wnl CBC      mom states the card wants a flu and quik strep , even tho he has had the symps for 3 weeks     PROTEIN-CALORIE MALNUTRITION    -BMI = 18   -has had mult surgeries and is on immunsuppression     Problem list and histories reviewed & adjusted, as indicated.  Additional history: as documented    Labs reviewed in EPIC    Reviewed and updated as needed this visit by clinical staff     Reviewed and updated as needed this visit by Provider         ROS:  CONSTITUTIONAL:POSITIVE  for chills, fatigue, fever , malaise and myalgias  I: NEGATIVE for worrisome rashes, moles or lesions  E: NEGATIVE for vision changes or irritation  ENT/MOUTH: POSITIVE for , fever and sore throat  RESP:POSITIVE for , cough-productive, dyspnea on exertion and wheezing  B: NEGATIVE for masses, tenderness or discharge  CV: NEGATIVE for chest pain, palpitations or peripheral edema  GI: NEGATIVE for nausea, abdominal pain, heartburn, or change in bowel  habits  : NEGATIVE for frequency, dysuria, or hematuria  M: NEGATIVE for significant arthralgias or myalgia  N: NEGATIVE for weakness, dizziness or paresthesias  E: NEGATIVE for temperature intolerance, skin/hair changes  H: NEGATIVE for bleeding problems  P: NEGATIVE for changes in mood or affect    OBJECTIVE:     /80 (BP Location: Right arm, Patient Position: Sitting, Cuff Size: Adult Regular)  Pulse 102  Temp 97.3  F (36.3  C) (Tympanic)  Resp 20  Wt 135 lb 8 oz (61.5 kg)  SpO2 98%  BMI 18.1 kg/m2  Body mass index is 18.1 kg/(m^2).  GENERAL: alert, no distress, pale, frail, fatigued, appears older than stated age and under wt    EYES: Eyes grossly normal to inspection, PERRL and conjunctivae and sclerae normal  HENT: ear canals and TM's normal, nose and mouth without ulcers or lesions  NECK: no adenopathy, no asymmetry, masses, or scars and thyroid normal to palpation  RESP: lungs clear to auscultation - no rales, rhonchi or wheezes  CV: regular rate and rhythm, normal S1 S2, no S3 or S4, no murmur, click or rub, no peripheral edema and peripheral pulses strong  ABDOMEN: soft, nontender, no hepatosplenomegaly, no masses and bowel sounds normal  MS: no gross musculoskeletal defects noted, no edema  SKIN: no suspicious lesions or rashes  NEURO: Normal strength and tone, mentation intact and speech normal  PSYCH: mentation appears normal, affect normal/bright  LYMPH: no cervical, supraclavicular, axillary, or inguinal adenopathy    Diagnostic Test Results:  Results for orders placed or performed in visit on 01/13/18 (from the past 24 hour(s))   Influenza A/B antigen   Result Value Ref Range    Influenza A/B Agn Specimen Nasal     Influenza A Negative NEG^Negative    Influenza B Negative NEG^Negative       ASSESSMENT/PLAN:               ICD-10-CM    1. Heart replaced by transplant (H) Z94.1 Beta strep group A culture   2. Immunosuppression (H) D89.9 Influenza A/B antigen   3. Throat pain R07.0 Rapid  strep screen     Influenza A/B antigen       Patient Instructions   Boil water and breath the warm vapors 2-3 times a day to try to open up the sinuses. Run a steamer or cold air vaporizer as much as possible. Take Mucinex plain blue  1200 mg  One tablet twice a day (This may come as 600mg/tablet and you need to take 2 tabs twice a day) or you could buy generic 400mgm / tab and take 2 tablets 3 x a day or 1 and 1/2 tablets 4 x a day . . Mucinex is guaifenesin, the major component of most cough syrups, because it makes the mucus less thick, and therefore it drains out better and you are less likely to cough from it dripping on the back of your throat.  Irrigate the  nose with plain water under the kitchen sink faucet or the shower.  Amaya pots, spray bottles, etc accumulate bacteria and are not recommended.   The tickle in the throat is also helped by gargling with vinegar and honey mixture, or pop or mouth wash as these coat the throat.  Please try to rinse teeth with water after using these .         Yesi Montanez MD  [facility] [address] [facility]     For information on Fall & Injury Prevention, visit: [email or web address] OR  [email or web address] OR  https://www.cdc.gov/steadi/patient.html

## 2022-05-01 NOTE — DISCHARGE NOTE OB - CARE PROVIDER_API CALL
Maico Gonsalez)  Obstetrics and Gynecology  [address] [place] [address]  Phone: [phone]  Fax: [phone]  Follow Up Time: 1 month   Rachael Elias)  Obstetrics and Gynecology  [address] [place] [address]  Phone: [phone]  Fax: [phone]  Established Patient  Follow Up Time: Routine

## 2022-05-01 NOTE — DISCHARGE NOTE OB - MATERIALS PROVIDED
Vaccinations/Breastfeeding Log/Breastfeeding Mother’s Support Group Information/Guide to Postpartum Care/Back To Sleep Handout/Shaken Baby Prevention Handout/Breastfeeding Guide and Packet/Birth Certificate Instructions/Tdap Vaccination (VIS Pub Date: January 24, 2012)

## 2022-05-01 NOTE — OB PROVIDER LABOR PROGRESS NOTE - ASSESSMENT
A/P: 37 yo IOL for PROM (4/26?) requesting pain medication, exam unchanged  - Stadol x1  - C/w PO Cytotec  - C/w EFM, toco, IVF    D/w Dr. Ceballos PGY4  GIORGI Cesar PGY1
Cont labor induction

## 2022-05-01 NOTE — DISCHARGE NOTE OB - PROVIDER TOKENS
PROVIDER:[TOKEN:[[id number]],FOLLOWUP:[1 month]] PROVIDER:[TOKEN:[[id number]],FOLLOWUP:[Routine],ESTABLISHEDPATIENT:[T]]

## 2022-05-01 NOTE — OB RN DELIVERY SUMMARY - NSSELHIDDEN_OBGYN_ALL_OB_FT
[NS_DeliveryAttending1_OBGYN_ALL_OB_FT:[id number]],[NS_DeliveryRN_OBGYN_ALL_OB_FT:[id number]=]

## 2022-05-01 NOTE — OB PROVIDER DELIVERY SUMMARY - NSSELHIDDEN_OBGYN_ALL_OB_FT
[NS_DeliveryAttending1_OBGYN_ALL_OB_FT:[id number]],[NS_DeliveryRN_OBGYN_ALL_OB_FT:[id number]=]

## 2022-05-01 NOTE — DISCHARGE NOTE OB - MEDICATION SUMMARY - MEDICATIONS TO TAKE
I will START or STAY ON the medications listed below when I get home from the hospital:    Prenatal 1 oral capsule  -- Indication: For Other pregnancy-related conditions, antepartum

## 2022-05-01 NOTE — OB RN DELIVERY SUMMARY - NS_SEPSISRSKCALC_OBGYN_ALL_OB_FT
EOS calculated successfully. EOS Risk Factor: 0.5/1000 live births ([facility] national incidence); GA=40w5d; Temp=99.68; RJV=311.267; GBS='Negative'; Antibiotics='No antibiotics or any antibiotics < 2 hrs prior to birth'

## 2022-05-01 NOTE — OB NEONATOLOGY/PEDIATRICIAN DELIVERY SUMMARY - NSPEDSNEONOTESA_OBGYN_ALL_OB_FT
Called to attend [facility] for cat II tracing of a 40.5 week infant born toa 36 year old  mom. maternal blood type O+, GBS neg from 3/30, PNL Neg/Nr/immune. Maternal medical history unremarkable. This pregnancy uncomplicated. Mom presented from office for decreased FM and also noted that she had PPROM since .  Pt also reports that 4 days ago on  she lost her mucous plug and has an increased in her vaginal discharge with occasional loss of what she believed to be urine. SROM  clear (~154 hours PTD). Delivery was IOL for PPROM. Peds called for cat II tracing. Infant already delivered on mothers chest crying when peds arrived. Infant brought to warmer, W/D/S/S. Apgars 8,9. EOS 1.47 sent to NICU for observation given elevated EOS.

## 2022-05-01 NOTE — DISCHARGE NOTE OB - CARE PLAN
1 Principal Discharge DX:	Vacuum extractor delivery  Assessment and plan of treatment:	REGULAR DIET  AMBULATION ENCOURAGED

## 2022-05-01 NOTE — DISCHARGE NOTE OB - PATIENT PORTAL LINK FT
You can access the FollowMyHealth Patient Portal offered by [facility] by registering at the following website: [email or web address]. By joining [employer]’s FollowMyHealth portal, you will also be able to view your health information using other applications (apps) compatible with our system.

## 2022-05-02 RX ORDER — TETANUS TOXOID, REDUCED DIPHTHERIA TOXOID AND ACELLULAR PERTUSSIS VACCINE, ADSORBED 5; 2.5; 8; 8; 2.5 [IU]/.5ML; [IU]/.5ML; UG/.5ML; UG/.5ML; UG/.5ML
0.5 SUSPENSION INTRAMUSCULAR ONCE
Refills: 0 | Status: COMPLETED | OUTPATIENT
Start: 2022-05-02 | End: 2022-05-02

## 2022-05-02 RX ORDER — IBUPROFEN 200 MG
600 TABLET ORAL EVERY 6 HOURS
Refills: 0 | Status: DISCONTINUED | OUTPATIENT
Start: 2022-05-02 | End: 2022-05-03

## 2022-05-02 RX ADMIN — Medication 975 MILLIGRAM(S): at 23:00

## 2022-05-02 RX ADMIN — Medication 600 MILLIGRAM(S): at 18:24

## 2022-05-02 RX ADMIN — SODIUM CHLORIDE 3 MILLILITER(S): 9 INJECTION INTRAMUSCULAR; INTRAVENOUS; SUBCUTANEOUS at 22:14

## 2022-05-02 RX ADMIN — ERTAPENEM SODIUM 120 MILLIGRAM(S): 1 INJECTION, POWDER, LYOPHILIZED, FOR SOLUTION INTRAMUSCULAR; INTRAVENOUS at 22:01

## 2022-05-02 RX ADMIN — SODIUM CHLORIDE 3 MILLILITER(S): 9 INJECTION INTRAMUSCULAR; INTRAVENOUS; SUBCUTANEOUS at 06:53

## 2022-05-02 RX ADMIN — Medication 600 MILLIGRAM(S): at 17:27

## 2022-05-02 RX ADMIN — SODIUM CHLORIDE 3 MILLILITER(S): 9 INJECTION INTRAMUSCULAR; INTRAVENOUS; SUBCUTANEOUS at 15:05

## 2022-05-02 RX ADMIN — Medication 600 MILLIGRAM(S): at 06:46

## 2022-05-02 RX ADMIN — Medication 975 MILLIGRAM(S): at 22:02

## 2022-05-02 RX ADMIN — Medication 1 TABLET(S): at 11:29

## 2022-05-02 RX ADMIN — TETANUS TOXOID, REDUCED DIPHTHERIA TOXOID AND ACELLULAR PERTUSSIS VACCINE, ADSORBED 0.5 MILLILITER(S): 5; 2.5; 8; 8; 2.5 SUSPENSION INTRAMUSCULAR at 22:02

## 2022-05-02 NOTE — PROGRESS NOTE ADULT - ASSESSMENT
A/P: 35yo PPD#1 s/p s/p VAVD with MLE, pregnancy c/b gHTN.  Patient is stable and doing well post-partum.     #gHTN  - BPs ON well controlled  - denies severe features   - baseline HELLP labs wnl; repeat prn   - no antihypertensives on board  - continue to monitor     #PPT   - PPT 38.0 following prolonged rupture of membranes   - Invanz x1  - blood, urine cultures sent; f/u results   - repeat AM CBC; trend from immediate postpartum CBC with WBC of 18  - PE unremarkable     #PPD 1  - Pain well controlled, continue current pain regimen  - Increase ambulation, SCDs when not ambulating  - Continue regular diet    Loida Lamb MD PGY1

## 2022-05-03 VITALS
OXYGEN SATURATION: 99 % | TEMPERATURE: 99 F | DIASTOLIC BLOOD PRESSURE: 76 MMHG | SYSTOLIC BLOOD PRESSURE: 122 MMHG | RESPIRATION RATE: 16 BRPM | HEART RATE: 78 BPM

## 2022-05-03 LAB
BASOPHILS # BLD AUTO: 0.06 K/UL — SIGNIFICANT CHANGE UP (ref 0–0.2)
BASOPHILS NFR BLD AUTO: 0.4 % — SIGNIFICANT CHANGE UP (ref 0–2)
EOSINOPHIL # BLD AUTO: 0.35 K/UL — SIGNIFICANT CHANGE UP (ref 0–0.5)
EOSINOPHIL NFR BLD AUTO: 2.3 % — SIGNIFICANT CHANGE UP (ref 0–6)
HCT VFR BLD CALC: 28.2 % — LOW (ref 34.5–45)
HGB BLD-MCNC: 9.7 G/DL — LOW (ref 11.5–15.5)
IANC: 9.86 K/UL — HIGH (ref 1.8–7.4)
IMM GRANULOCYTES NFR BLD AUTO: 0.9 % — SIGNIFICANT CHANGE UP (ref 0–1.5)
LYMPHOCYTES # BLD AUTO: 25.3 % — SIGNIFICANT CHANGE UP (ref 13–44)
LYMPHOCYTES # BLD AUTO: 3.81 K/UL — HIGH (ref 1–3.3)
MCHC RBC-ENTMCNC: 34.4 GM/DL — SIGNIFICANT CHANGE UP (ref 32–36)
MCHC RBC-ENTMCNC: 34.6 PG — HIGH (ref 27–34)
MCV RBC AUTO: 100.7 FL — HIGH (ref 80–100)
MONOCYTES # BLD AUTO: 0.82 K/UL — SIGNIFICANT CHANGE UP (ref 0–0.9)
MONOCYTES NFR BLD AUTO: 5.5 % — SIGNIFICANT CHANGE UP (ref 2–14)
NEUTROPHILS # BLD AUTO: 9.86 K/UL — HIGH (ref 1.8–7.4)
NEUTROPHILS NFR BLD AUTO: 65.6 % — SIGNIFICANT CHANGE UP (ref 43–77)
NRBC # BLD: 0 /100 WBCS — SIGNIFICANT CHANGE UP
NRBC # FLD: 0 K/UL — SIGNIFICANT CHANGE UP
PLATELET # BLD AUTO: 219 K/UL — SIGNIFICANT CHANGE UP (ref 150–400)
RBC # BLD: 2.8 M/UL — LOW (ref 3.8–5.2)
RBC # FLD: 13.3 % — SIGNIFICANT CHANGE UP (ref 10.3–14.5)
WBC # BLD: 15.03 K/UL — HIGH (ref 3.8–10.5)
WBC # FLD AUTO: 15.03 K/UL — HIGH (ref 3.8–10.5)

## 2022-05-03 RX ORDER — ACETAMINOPHEN 500 MG
3 TABLET ORAL
Qty: 0 | Refills: 0 | DISCHARGE
Start: 2022-05-03

## 2022-05-03 RX ORDER — IBUPROFEN 200 MG
1 TABLET ORAL
Qty: 0 | Refills: 0 | DISCHARGE
Start: 2022-05-03

## 2022-05-03 RX ADMIN — Medication 975 MILLIGRAM(S): at 10:50

## 2022-05-03 RX ADMIN — Medication 600 MILLIGRAM(S): at 05:29

## 2022-05-03 RX ADMIN — Medication 975 MILLIGRAM(S): at 10:00

## 2022-05-03 RX ADMIN — Medication 1 TABLET(S): at 10:09

## 2022-05-03 RX ADMIN — SODIUM CHLORIDE 3 MILLILITER(S): 9 INJECTION INTRAMUSCULAR; INTRAVENOUS; SUBCUTANEOUS at 05:32

## 2022-05-03 RX ADMIN — Medication 600 MILLIGRAM(S): at 01:00

## 2022-05-03 RX ADMIN — Medication 600 MILLIGRAM(S): at 00:16

## 2022-05-03 RX ADMIN — Medication 600 MILLIGRAM(S): at 06:15

## 2022-05-03 NOTE — PROGRESS NOTE ADULT - SUBJECTIVE AND OBJECTIVE BOX
Instructions given to patient about BP monitoring & meeting criteria for gHTN.      -BP Cuff Rx sent to [facility] in [place]  -instructions given on BP monitoring; TID; call MD office if greater than 140/90; report to triage is greater than 160/100  -reviewed signs and symptoms related to preeclampsia with patient such as HA, Change in vision, N/V. RUQ pain   -keep log BP's to present to MD during appointment or triage   -All questions answered, patient verbalized understanding   -Patient to return to office for BP check in 2 weeks or earlier based on BPs logged per Dr. Elias  -6 weeks for postpartum check-up    Suzette Chandra NP
S: Patient doing well. Minimal lochia. Pain controlled.    O: Vital Signs Last 24 Hrs  T(C): 36.4 (02 May 2022 06:46), Max: 38.2 (01 May 2022 18:46)  T(F): 97.5 (02 May 2022 06:46), Max: 100.8 (01 May 2022 18:46)  HR: 87 (02 May 2022 06:46) (58 - 122)  BP: 111/72 (02 May 2022 06:46) (100/58 - 180/82)  BP(mean): 79 (01 May 2022 13:15) (75 - 86)  RR: 18 (02 May 2022 06:46) (18 - 18)  SpO2: 100% (02 May 2022 06:46) (75% - 100%)    Gen: NAD  Abd: soft, NT, ND, fundus firm below umbilicus  Lochia: moderate  Ext: no tenderness    Labs:                        10.8   18.73 )-----------( 239      ( 01 May 2022 20:55 )             32.1     rh pos    A: 36y PPD#1 s/p  doing well.    Plan:  continue care  discharge instructions given
OB Progress Note: VAVD PPD#1    S: 35yo  PPD#1 s/p VAVD with MLE, pregnancy c/b gHTN. Patient feels well. Pain is well controlled. She is tolerating a regular diet and passing flatus. She is voiding spontaneously, and ambulating without difficulty. Denies HA, dizziness, lightheadedness, blurry vision. Denies CP/SOB. Denies N/V.    O:  Vitals:  Vital Signs Last 24 Hrs  T(C): 36.5 (02 May 2022 01:42), Max: 38.2 (01 May 2022 18:46)  T(F): 97.7 (02 May 2022 01:42), Max: 100.8 (01 May 2022 18:46)  HR: 83 (02 May 2022 01:42) (54 - 122)  BP: 100/58 (02 May 2022 01:42) (100/58 - 180/82)  BP(mean): 79 (01 May 2022 13:15) (75 - 86)  RR: 18 (02 May 2022 01:42) (17 - 18)  SpO2: 99% (02 May 2022 01:42) (75% - 100%)    MEDICATIONS  (STANDING):  acetaminophen     Tablet .. 975 milliGRAM(s) Oral <User Schedule>  diphtheria/tetanus/pertussis (acellular) Vaccine (ADAcel) 0.5 milliLiter(s) IntraMuscular once  ertapenem  IVPB 1000 milliGRAM(s) IV Intermittent every 24 hours  ibuprofen  Tablet. 600 milliGRAM(s) Oral every 6 hours  oxytocin Infusion 333.333 milliUNIT(s)/Min (1000 mL/Hr) IV Continuous <Continuous>  oxytocin Infusion 333.333 milliUNIT(s)/Min (1000 mL/Hr) IV Continuous <Continuous>  prenatal multivitamin 1 Tablet(s) Oral daily  sodium chloride 0.9% lock flush 3 milliLiter(s) IV Push every 8 hours      Labs:  Blood type: O Positive  Rubella IgG: RPR: Negative                          10.8<L>   18.73<H> >-----------< 239    ( 05-01 @ 20:55 )             32.1<L>                        12.1   12.73<H> >-----------< 271    ( 05-01 @ 03:19 )             34.8                        12.1   7.19 >-----------< 224    ( 04-30 @ 15:09 )             34.9    05-01-22 @ 03:19      134<L>  |  104  |  7   ----------------------------<  81  3.7   |  19<L>  |  0.60    04-30-22 @ 15:09      134<L>  |  103  |  9   ----------------------------<  111<H>  3.7   |  20<L>  |  0.59        Ca    9.0      01 May 2022 03:19  Ca    8.9      30 Apr 2022 15:09    TPro  6.0  /  Alb  3.3  /  TBili  0.4  /  DBili  x   /  AST  39<H>  /  ALT  42<H>  /  AlkPhos  309<H>  05-01-22 @ 03:19  TPro  6.6  /  Alb  3.5  /  TBili  0.2  /  DBili  x   /  AST  41<H>  /  ALT  48<H>  /  AlkPhos  316<H>  04-30-22 @ 15:09          Physical Exam:  General: NAD  Abdomen: soft, non-tender, non-distended, fundus firm  Vaginal: Lochia wnl  Extremities: No erythema/edema    
S: Patient doing well. Minimal lochia. Pain controlled. breastfeeding    O: Vital Signs Last 24 Hrs  T(C): 36.5 (03 May 2022 06:20), Max: 37 (02 May 2022 18:00)  T(F): 97.7 (03 May 2022 06:20), Max: 98.6 (02 May 2022 18:00)  HR: 69 (03 May 2022 06:20) (69 - 84)  BP: 107/63 (03 May 2022 06:20) (100/58 - 116/80)  BP(mean): --  RR: 18 (03 May 2022 06:20) (18 - 18)  SpO2: 100% (03 May 2022 06:20) (98% - 100%)    Gen: NAD  Abd: soft, NT, ND, fundus firm below umbilicus  Lochia: moderate  Ext: no tenderness    Labs:                        9.7    15.03 )-----------( 219      ( 03 May 2022 06:29 )             28.2       A: 36y PPD# 2 s/p  doing well.    puerperal fever    Plan: discharge home today

## 2022-05-07 LAB
CULTURE RESULTS: SIGNIFICANT CHANGE UP
CULTURE RESULTS: SIGNIFICANT CHANGE UP
SPECIMEN SOURCE: SIGNIFICANT CHANGE UP
SPECIMEN SOURCE: SIGNIFICANT CHANGE UP

## 2022-07-04 ENCOUNTER — EMERGENCY (EMERGENCY)
Facility: HOSPITAL | Age: 37
LOS: 1 days | Discharge: HOME | End: 2022-07-04
Attending: EMERGENCY MEDICINE
Payer: COMMERCIAL

## 2022-07-04 VITALS
SYSTOLIC BLOOD PRESSURE: 105 MMHG | HEIGHT: 62 IN | HEART RATE: 86 BPM | WEIGHT: 145.06 LBS | DIASTOLIC BLOOD PRESSURE: 73 MMHG | RESPIRATION RATE: 18 BRPM | TEMPERATURE: 98 F | OXYGEN SATURATION: 98 %

## 2022-07-04 DIAGNOSIS — Z98.890 OTHER SPECIFIED POSTPROCEDURAL STATES: Chronic | ICD-10-CM

## 2022-07-04 LAB
ANION GAP SERPL CALC-SCNC: 6 MMOL/L — SIGNIFICANT CHANGE UP (ref 5–17)
BASOPHILS # BLD AUTO: 0.03 K/UL — SIGNIFICANT CHANGE UP (ref 0–0.2)
BASOPHILS NFR BLD AUTO: 0.4 % — SIGNIFICANT CHANGE UP (ref 0–2)
BUN SERPL-MCNC: 16 MG/DL — SIGNIFICANT CHANGE UP (ref 7–18)
CALCIUM SERPL-MCNC: 9.6 MG/DL — SIGNIFICANT CHANGE UP (ref 8.4–10.5)
CHLORIDE SERPL-SCNC: 106 MMOL/L — SIGNIFICANT CHANGE UP (ref 96–108)
CO2 SERPL-SCNC: 28 MMOL/L — SIGNIFICANT CHANGE UP (ref 22–31)
CREAT SERPL-MCNC: 0.65 MG/DL — SIGNIFICANT CHANGE UP (ref 0.5–1.3)
EGFR: 118 ML/MIN/1.73M2 — SIGNIFICANT CHANGE UP
EOSINOPHIL # BLD AUTO: 0.31 K/UL — SIGNIFICANT CHANGE UP (ref 0–0.5)
EOSINOPHIL NFR BLD AUTO: 3.7 % — SIGNIFICANT CHANGE UP (ref 0–6)
GLUCOSE SERPL-MCNC: 85 MG/DL — SIGNIFICANT CHANGE UP (ref 70–99)
HCG SERPL-ACNC: <1 MIU/ML — SIGNIFICANT CHANGE UP
HCT VFR BLD CALC: 36.5 % — SIGNIFICANT CHANGE UP (ref 34.5–45)
HGB BLD-MCNC: 12.6 G/DL — SIGNIFICANT CHANGE UP (ref 11.5–15.5)
IMM GRANULOCYTES NFR BLD AUTO: 0.2 % — SIGNIFICANT CHANGE UP (ref 0–1.5)
LYMPHOCYTES # BLD AUTO: 3.09 K/UL — SIGNIFICANT CHANGE UP (ref 1–3.3)
LYMPHOCYTES # BLD AUTO: 37 % — SIGNIFICANT CHANGE UP (ref 13–44)
MCHC RBC-ENTMCNC: 33.8 PG — SIGNIFICANT CHANGE UP (ref 27–34)
MCHC RBC-ENTMCNC: 34.5 GM/DL — SIGNIFICANT CHANGE UP (ref 32–36)
MCV RBC AUTO: 97.9 FL — SIGNIFICANT CHANGE UP (ref 80–100)
MONOCYTES # BLD AUTO: 0.56 K/UL — SIGNIFICANT CHANGE UP (ref 0–0.9)
MONOCYTES NFR BLD AUTO: 6.7 % — SIGNIFICANT CHANGE UP (ref 2–14)
NEUTROPHILS # BLD AUTO: 4.35 K/UL — SIGNIFICANT CHANGE UP (ref 1.8–7.4)
NEUTROPHILS NFR BLD AUTO: 52 % — SIGNIFICANT CHANGE UP (ref 43–77)
NRBC # BLD: 0 /100 WBCS — SIGNIFICANT CHANGE UP (ref 0–0)
PLATELET # BLD AUTO: 298 K/UL — SIGNIFICANT CHANGE UP (ref 150–400)
POTASSIUM SERPL-MCNC: 3.9 MMOL/L — SIGNIFICANT CHANGE UP (ref 3.5–5.3)
POTASSIUM SERPL-SCNC: 3.9 MMOL/L — SIGNIFICANT CHANGE UP (ref 3.5–5.3)
RBC # BLD: 3.73 M/UL — LOW (ref 3.8–5.2)
RBC # FLD: 12.1 % — SIGNIFICANT CHANGE UP (ref 10.3–14.5)
SODIUM SERPL-SCNC: 140 MMOL/L — SIGNIFICANT CHANGE UP (ref 135–145)
WBC # BLD: 8.36 K/UL — SIGNIFICANT CHANGE UP (ref 3.8–10.5)
WBC # FLD AUTO: 8.36 K/UL — SIGNIFICANT CHANGE UP (ref 3.8–10.5)

## 2022-07-04 PROCEDURE — 96375 TX/PRO/DX INJ NEW DRUG ADDON: CPT

## 2022-07-04 PROCEDURE — 99284 EMERGENCY DEPT VISIT MOD MDM: CPT

## 2022-07-04 PROCEDURE — 80048 BASIC METABOLIC PNL TOTAL CA: CPT

## 2022-07-04 PROCEDURE — 85025 COMPLETE CBC W/AUTO DIFF WBC: CPT

## 2022-07-04 PROCEDURE — 96374 THER/PROPH/DIAG INJ IV PUSH: CPT

## 2022-07-04 PROCEDURE — 36415 COLL VENOUS BLD VENIPUNCTURE: CPT

## 2022-07-04 PROCEDURE — 99284 EMERGENCY DEPT VISIT MOD MDM: CPT | Mod: 25

## 2022-07-04 PROCEDURE — 84702 CHORIONIC GONADOTROPIN TEST: CPT

## 2022-07-04 RX ORDER — DIPHENHYDRAMINE HCL 50 MG
25 CAPSULE ORAL ONCE
Refills: 0 | Status: COMPLETED | OUTPATIENT
Start: 2022-07-04 | End: 2022-07-04

## 2022-07-04 RX ORDER — FAMOTIDINE 10 MG/ML
1 INJECTION INTRAVENOUS
Qty: 7 | Refills: 0
Start: 2022-07-04 | End: 2022-07-10

## 2022-07-04 RX ORDER — FAMOTIDINE 10 MG/ML
20 INJECTION INTRAVENOUS ONCE
Refills: 0 | Status: COMPLETED | OUTPATIENT
Start: 2022-07-04 | End: 2022-07-04

## 2022-07-04 RX ADMIN — FAMOTIDINE 20 MILLIGRAM(S): 10 INJECTION INTRAVENOUS at 07:03

## 2022-07-04 RX ADMIN — Medication 25 MILLIGRAM(S): at 07:03

## 2022-07-04 RX ADMIN — Medication 125 MILLIGRAM(S): at 07:03

## 2022-07-04 NOTE — ED PROVIDER NOTE - RADIATION
no radiation Benzoyl Peroxide Pregnancy And Lactation Text: This medication is Pregnancy Category C. It is unknown if benzoyl peroxide is excreted in breast milk.

## 2022-07-04 NOTE — ED ADULT NURSE NOTE - OBJECTIVE STATEMENT
the  patient  is a 36 yr female complaining  of rashes all over the body mostly in thighs since yesterday s/p   sea food

## 2022-07-04 NOTE — ED PROVIDER NOTE - PATIENT PORTAL LINK FT
You can access the FollowMyHealth Patient Portal offered by [facility] by registering at the following website: [email or web address]. By joining [employer]’s FollowMyHealth portal, you will also be able to view your health information using other applications (apps) compatible with our system.

## 2022-07-04 NOTE — ED ADULT NURSE NOTE - NSFALLRSKASSESSDT_ED_ALL_ED
Patient with chronic pain in her legs  Polypharmacy  Spinal stimulator present      Plan:  · Patient restarted on Cymbalta and Oxycodone  · Also receiving Lyrica and Voltaren  · Management per SOD 04-Jul-2022 06:21

## 2022-09-23 PROBLEM — Z00.00 ENCOUNTER FOR PREVENTIVE HEALTH EXAMINATION: Status: ACTIVE | Noted: 2022-09-23

## 2022-10-31 NOTE — OB PROVIDER TRIAGE NOTE - LIVING CHILDREN, OB PROFILE
Refill request for LEVOTHYROXINE medication.      Name of Stephany Mcclendon  visit - 10/25/22     Pending visit - 11/10/22    Last refill -4/7/22      Medication Contract signed -   Last Oarrs ran-         Additional Comments
0

## 2023-02-25 ENCOUNTER — EMERGENCY (EMERGENCY)
Facility: HOSPITAL | Age: 38
LOS: 1 days | Discharge: HOME | End: 2023-02-25
Attending: EMERGENCY MEDICINE
Payer: COMMERCIAL

## 2023-02-25 VITALS
RESPIRATION RATE: 16 BRPM | OXYGEN SATURATION: 97 % | WEIGHT: 143.08 LBS | TEMPERATURE: 98 F | HEART RATE: 89 BPM | DIASTOLIC BLOOD PRESSURE: 77 MMHG | SYSTOLIC BLOOD PRESSURE: 119 MMHG | HEIGHT: 62 IN

## 2023-02-25 DIAGNOSIS — Z98.890 OTHER SPECIFIED POSTPROCEDURAL STATES: Chronic | ICD-10-CM

## 2023-02-25 PROCEDURE — 99284 EMERGENCY DEPT VISIT MOD MDM: CPT

## 2023-02-26 LAB
FLUAV AG NPH QL: SIGNIFICANT CHANGE UP
FLUBV AG NPH QL: SIGNIFICANT CHANGE UP
SARS-COV-2 RNA SPEC QL NAA+PROBE: SIGNIFICANT CHANGE UP

## 2023-02-26 PROCEDURE — 87637 SARSCOV2&INF A&B&RSV AMP PRB: CPT

## 2023-02-26 PROCEDURE — 99283 EMERGENCY DEPT VISIT LOW MDM: CPT

## 2023-02-26 NOTE — ED ADULT NURSE NOTE - CAS EDP DISCH TYPE
Problem: Prexisting or High Potential for Compromised Skin Integrity  Goal: Skin integrity is maintained or improved  Description: INTERVENTIONS:  - Identify patients at risk for skin breakdown  - Assess and monitor skin integrity  - Assess and monitor nutrition and hydration status  - Monitor labs   - Assess for incontinence   - Turn and reposition patient  - Assist with mobility/ambulation  - Relieve pressure over bony prominences  - Avoid friction and shearing  - Provide appropriate hygiene as needed including keeping skin clean and dry  - Evaluate need for skin moisturizer/barrier cream  - Collaborate with interdisciplinary team   - Patient/family teaching  - Consider wound care consult   Outcome: Progressing     Problem: SAFETY ADULT  Goal: Patient will remain free of falls  Description: INTERVENTIONS:  - Educate patient/family on patient safety including physical limitations  - Instruct patient to call for assistance with activity   - Consult OT/PT to assist with strengthening/mobility   - Keep Call bell within reach  - Keep bed low and locked with side rails adjusted as appropriate  - Keep care items and personal belongings within reach  - Initiate and maintain comfort rounds  - Make Fall Risk Sign visible to staff  - Offer Toileting every 2 Hours, in advance of need  - Initiate/Maintain alarm  - Obtain necessary fall risk management equipment:   - Apply yellow socks and bracelet for high fall risk patients  - Consider moving patient to room near nurses station  Outcome: Progressing Home

## 2023-02-26 NOTE — ED PROVIDER NOTE - PHYSICAL EXAMINATION
Gen.  no acute distress, well appearing  CVS: S1S2   Lungs b/l air entry  Neuro: aaox3 no focal deficit  MSK: moving all ext spon

## 2023-02-26 NOTE — ED PROVIDER NOTE - OBJECTIVE STATEMENT
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 has some body aches and chills no know recent exposure to COVID. no other complaints. no vomiting no diarrhea.

## 2023-02-26 NOTE — ED PROVIDER NOTE - NSFOLLOWUPINSTRUCTIONS_ED_ALL_ED_FT
Your diagnosis this visit was: Encounter for COVID test     From this ED visit you were prescribed: no new medications    You may be contacted by our Emergency Department Referrals Coordinator to set up your follow-up appointment within 24-48 hours of your discharge, Monday through Friday.   We recommend you follow up with: your medical doctor    Please return to the Emergency Department if you experience any of the following symptoms:  - Shortness of breath or trouble breathing  - Pressure, pain, or tightness in the chest  - Face drooping, arm weakness or speech difficulty,  - Persistent or severe vomiting  - Head injury or loss of consciousness  - Nonstop bleeding or an open wound

## 2023-02-26 NOTE — ED PROVIDER NOTE - PATIENT PORTAL LINK FT
You can access the FollowMyHealth Patient Portal offered by [facility] by registering at the following website: [email or web address]. By joining [employer]’s FollowMyHealth portal, you will also be able to view your health information using other applications (apps) compatible with our system.

## 2023-06-29 NOTE — ED ADULT NURSE NOTE - NURSING NEURO ORIENTATION
Per telephone encounter from 6/21/23, Dr. Kolb would like patient to follow up with Dr. Rhodes again (last seen 8/2021).     Okay to schedule.    oriented to person, place and time

## 2024-01-18 NOTE — ED PROVIDER NOTE - CPE EDP CARDIAC NORM
07/22/2022 19  15 - 37 U/L Final    WBC 07/22/2022 4.3  4.0 - 11.0 K/uL Final    RBC 07/22/2022 4.42  4.00 - 5.20 M/uL Final    Hemoglobin 07/22/2022 12.5  12.0 - 16.0 g/dL Final    Hematocrit 07/22/2022 37.5  36.0 - 48.0 % Final    MCV 07/22/2022 84.8  80.0 - 100.0 fL Final    MCH 07/22/2022 28.3  26.0 - 34.0 pg Final    MCHC 07/22/2022 33.3  31.0 - 36.0 g/dL Final    RDW 07/22/2022 14.9  12.4 - 15.4 % Final    Platelets 07/22/2022 243  135 - 450 K/uL Final    MPV 07/22/2022 9.1  5.0 - 10.5 fL Final    Neutrophils % 07/22/2022 53.6  % Final    Lymphocytes % 07/22/2022 34.7  % Final    Monocytes % 07/22/2022 9.5  % Final    Eosinophils % 07/22/2022 1.3  % Final    Basophils % 07/22/2022 0.9  % Final    Neutrophils Absolute 07/22/2022 2.3  1.7 - 7.7 K/uL Final    Lymphocytes Absolute 07/22/2022 1.5  1.0 - 5.1 K/uL Final    Monocytes Absolute 07/22/2022 0.4  0.0 - 1.3 K/uL Final    Eosinophils Absolute 07/22/2022 0.1  0.0 - 0.6 K/uL Final    Basophils Absolute 07/22/2022 0.0  0.0 - 0.2 K/uL Final       Assessment   Plan   1. Encounter for well adult exam without abnormal findings  - CBC with Auto Differential; Future  - Comprehensive Metabolic Panel; Future  - Lipid Panel; Future  - TSH; Future  - Urinalysis; Future  -Pap smear done on 2/4/2021 and patient states she will have a Pap smear done in November 2024  -Mammogram done on 12/14/2023  -Colonoscopy done 10/2023 per patient and will request report  -Immunization records reviewed and patient will do a Nurse visit for the Tdap    2. Screening for diabetes mellitus  - Hemoglobin A1C; Future    3. Excessive cerumen in both ear canals  -Ear wax removed by Medical Assistant soaking with peroxide for 5 minutes and then wax flushed out with an ear wash spray bottle                   Personalized Preventive Plan   Current Health Maintenance Status  Immunization History   Administered Date(s) Administered    COVID-19, MODERNA BLUE border, Primary or Immunocompromised, (age  normal...

## 2024-05-28 NOTE — OB RN PATIENT PROFILE - NSICDXPASTMEDICALHX_GEN_ALL_CORE_FT
The Service to Sports Medicine order in work queue [id number] requested on 5/15/2024 has been deferred for one year for not being able to contact patient to schedule.   PAST MEDICAL HISTORY:  Childhood asthma     No pertinent past medical history

## 2024-07-26 NOTE — OB PROVIDER TRIAGE NOTE - NS_FETALMONCTX30_OBGYN_ALL_OB
[FreeTextEntry1] : Polysomnogram 5/28/24: AHI of 1.7 with O2 jolie of 92%. Less than or equal to 5 Contractions in 30 minutes

## 2024-11-25 NOTE — ED ADULT TRIAGE NOTE - LOCATION:
Two patient identifiers used      41 year old patient last seen in the office on 4/19/2024 for ae. Patient reports doing self breast exam over the weekend at a slightly different point in the cycle . Patient noticed to palpation dime size ,none moveable not red or swollen and not nipple discharge .    Patient was placed on the schedule for ov evaluation on 12/4/2024 at 10:20 am ( ok per MK)  Patient was offered earlier appointment and declined.  Patient verbalized understanding.   Left arm;

## 2025-01-10 ENCOUNTER — ASOB RESULT (OUTPATIENT)
Age: 40
End: 2025-01-10

## 2025-01-10 ENCOUNTER — APPOINTMENT (OUTPATIENT)
Dept: ANTEPARTUM | Facility: CLINIC | Age: 40
End: 2025-01-10
Payer: COMMERCIAL

## 2025-01-10 PROCEDURE — 36415 COLL VENOUS BLD VENIPUNCTURE: CPT

## 2025-01-10 PROCEDURE — 76801 OB US < 14 WKS SINGLE FETUS: CPT

## 2025-01-10 PROCEDURE — 76813 OB US NUCHAL MEAS 1 GEST: CPT

## 2025-03-19 NOTE — OB RN PATIENT PROFILE - WILL THE PATIENT ACCEPT THE PFIZER COVID-19 VACCINE IF ELIGIBLE AND IT IS AVAILABLE?
Please kindly review this medication    [x] FAILS PROTOCOL            [x] Controlled Substance             [] Medication not previously prescribed by Provider            [] Due for appointment- no future appointment scheduled            [] BP reading            [] Labs            [] Depression Screening            [] Asthma (ACT recorded/ACT score)    [] HAS NO PROTOCOL ATTACHED     Recent fill dates: 3/4/25, 2/2/25, and 12/19/24  Date of  last written prescription: 3/4/25   Last written quantity: #15 each and processed as a 4 day supply  LAST OFFICE VISIT: 5/9/2024  [x] Takes as needed  [] Takes scheduled daily   No

## 2025-04-14 NOTE — OB PROVIDER TRIAGE NOTE - ADDITIONAL INSTRUCTIONS
non-distended/non-tender
reviewed labor precautions  reviewed fetal kick counts   encouraged abdominal binder due to increased pressure     has OB appointment for Next tuesday  return to triage otherwise

## 2025-04-28 NOTE — OB RN DELIVERY SUMMARY - NS_PLACENTDISPOA_OBGYN_ALL_OB
Lanny  calling in regards to paperwork that needs to be signed for home care. Writer provided Lanny with the fax number. Paper work was faxed over and placed in the nurses folder.  Lanny's Fax#  [phone]    Lanny  requesting call back at [phone].   
Please call Lanny Regarding home care orders   
Discarded in the usual manner

## 2025-05-07 NOTE — OB PROVIDER TRIAGE NOTE - NS_CHIEFCOMPLAINT_OBGYN_ALL_OB
Medication: nortriptyline (PAMELOR) 10 MG capsule   Last office visit date: 2/21/25  Medication Refill Protocol Failed.  Protocol approved due to: identified sig mis match, same prescription.   
Other